# Patient Record
Sex: FEMALE | Race: WHITE | NOT HISPANIC OR LATINO | Employment: OTHER | URBAN - METROPOLITAN AREA
[De-identification: names, ages, dates, MRNs, and addresses within clinical notes are randomized per-mention and may not be internally consistent; named-entity substitution may affect disease eponyms.]

---

## 2021-01-15 DIAGNOSIS — Z23 NEED FOR VACCINATION: ICD-10-CM

## 2021-02-16 ENCOUNTER — IMMUNIZATION (OUTPATIENT)
Dept: FAMILY PLANNING/WOMEN'S HEALTH CLINIC | Facility: IMMUNIZATION CENTER | Age: 76
End: 2021-02-16
Attending: INTERNAL MEDICINE
Payer: MEDICARE

## 2021-02-16 DIAGNOSIS — Z23 NEED FOR VACCINATION: ICD-10-CM

## 2021-02-16 DIAGNOSIS — Z23 ENCOUNTER FOR VACCINATION: Primary | ICD-10-CM

## 2021-02-16 PROCEDURE — 91300 PFIZER SARS-COV-2 VACCINE: CPT | Performed by: INTERNAL MEDICINE

## 2021-02-16 PROCEDURE — 0001A PFIZER SARS-COV-2 VACCINE: CPT | Performed by: INTERNAL MEDICINE

## 2021-03-02 ENCOUNTER — TELEPHONE (OUTPATIENT)
Dept: HEMATOLOGY ONCOLOGY | Facility: MEDICAL CENTER | Age: 76
End: 2021-03-02

## 2021-03-02 NOTE — TELEPHONE ENCOUNTER
Received referral to lung cancer screening program.  Chart review to assess for lung cancer screening program eligibility.   1. Age 55-77 yrs of age? Yes 75 y.o.  2. 30 pack year hx of smoking, or greater? Yes 1 [id number] hx  3. Current smoker or if quit, has pt quit within last 15 yrs?Yes  Current smoker  4. Any signs or symptoms of lung cancer? None noted  5. Previous history of lung cancer? None noted  6. Chest CT within past 12 mos.? None noted  Patient does meet eligibility criteria. [facility] scheduling notified to schedule the shared decision making visit.

## 2021-03-09 ENCOUNTER — OFFICE VISIT (OUTPATIENT)
Dept: HEMATOLOGY ONCOLOGY | Facility: MEDICAL CENTER | Age: 76
End: 2021-03-09
Payer: MEDICARE

## 2021-03-09 VITALS
HEART RATE: 84 BPM | BODY MASS INDEX: 21.81 KG/M2 | TEMPERATURE: 99.3 F | HEIGHT: 61 IN | SYSTOLIC BLOOD PRESSURE: 138 MMHG | OXYGEN SATURATION: 92 % | RESPIRATION RATE: 19 BRPM | DIASTOLIC BLOOD PRESSURE: 72 MMHG | WEIGHT: 115.52 LBS

## 2021-03-09 DIAGNOSIS — F17.210 CIGARETTE SMOKER: ICD-10-CM

## 2021-03-09 PROCEDURE — G0296 VISIT TO DETERM LDCT ELIG: HCPCS | Performed by: NURSE PRACTITIONER

## 2021-03-09 RX ORDER — BUPROPION HYDROCHLORIDE 150 MG/1
TABLET, EXTENDED RELEASE ORAL
COMMUNITY
Start: 2021-02-22 | End: 2021-07-02

## 2021-03-09 RX ORDER — LIOTHYRONINE SODIUM 5 UG/1
10 TABLET ORAL DAILY
COMMUNITY
Start: 2020-12-28

## 2021-03-09 RX ORDER — IBANDRONATE SODIUM 150 MG/1
TABLET, FILM COATED ORAL
COMMUNITY
Start: 2021-02-22

## 2021-03-09 RX ORDER — AMLODIPINE BESYLATE 5 MG/1
5 TABLET ORAL
COMMUNITY
Start: 2021-02-27

## 2021-03-09 RX ORDER — LOSARTAN POTASSIUM 100 MG/1
100 TABLET ORAL
COMMUNITY
Start: 2021-02-22

## 2021-03-09 RX ORDER — TRIAMTERENE AND HYDROCHLOROTHIAZIDE 37.5; 25 MG/1; MG/1
1 TABLET ORAL DAILY
COMMUNITY
Start: 2021-03-02

## 2021-03-09 ASSESSMENT — PAIN SCALES - GENERAL: PAINLEVEL: 2=MINIMAL-SLIGHT

## 2021-03-09 ASSESSMENT — ENCOUNTER SYMPTOMS
SPUTUM PRODUCTION: 1
WEIGHT LOSS: 0
HEMOPTYSIS: 0
SHORTNESS OF BREATH: 1
WHEEZING: 1
COUGH: 1

## 2021-03-09 ASSESSMENT — PATIENT HEALTH QUESTIONNAIRE - PHQ9: CLINICAL INTERPRETATION OF PHQ2 SCORE: 0

## 2021-03-09 NOTE — PROGRESS NOTES
"Subjective:      Rose Oshea is a 75 y.o. female who presents with Lung Cancer Screening Program Prescreen ([facility] Dep/Ashley Montgomery ) for lung cancer screening shared decision making visit.           HPI    Patient seen today for initial lung cancer screening visit. Patient referred by her PCP Ashley Montgomery PA-C.     The patient meets eligibility criteria including age, smoking history (30+ pack years), if former smoker, quit in the last 15 years, and absence of signs or symptoms of lung cancer.    - Age - 75  - Smoking history - Patient has smoked for 55 years at an average of 1 ppd = 55 pack year smoking history.  - Current smoking status - Current smoker and plans to start Wellbutrin next week in an attempt to quit.   - No symptoms of lung cancer and no previous history of lung cancer     Not on File  Current Outpatient Medications on File Prior to Visit   Medication Sig Dispense Refill   • amLODIPine (NORVASC) 5 MG Tab Take 5 mg by mouth every day.     • buPROPion SR (WELLBUTRIN-SR) 150 MG TABLET SR 12 HR sustained-release tablet TAKE ONE TABLET BY MOUTH ONCE DAILY FOR THREE DAYS, THEN TWICE DAILY     • ibandronate (BONIVA) 150 MG tablet PLEASE SEE ATTACHED FOR DETAILED DIRECTIONS     • liothyronine (CYTOMEL) 5 MCG Tab Take 10 mcg by mouth.     • losartan (COZAAR) 100 MG Tab Take 100 mg by mouth every day.     • triamterene-hctz (MAXZIDE-25/DYAZIDE) 37.5-25 MG Tab      • ANORO ELLIPTA 62.5-25 MCG/INH AEROSOL POWDER, BREATH ACTIVATED inhaler        No current facility-administered medications on file prior to visit.       Review of Systems   Constitutional: Negative for malaise/fatigue and weight loss.   Respiratory: Positive for cough (worse in the AM), sputum production, shortness of breath (with exertion) and wheezing (occasional). Negative for hemoptysis.           Objective:     /72   Pulse 84   Temp 37.4 °C (99.3 °F) (Temporal)   Resp 19   Ht 1.549 m (5' 1\")   Wt 52.4 kg (115 lb 8.3 oz)  "  SpO2 92%   BMI 21.83 kg/m²      Physical Exam  Vitals reviewed.   Constitutional:       General: She is not in acute distress.     Appearance: Normal appearance. She is well-developed. She is not diaphoretic.   HENT:      Head: Normocephalic and atraumatic.   Cardiovascular:      Rate and Rhythm: Normal rate and regular rhythm.      Heart sounds: Normal heart sounds. No murmur. No friction rub. No gallop.    Pulmonary:      Effort: Pulmonary effort is normal. No respiratory distress.      Breath sounds: Normal breath sounds. No wheezing.   Musculoskeletal:         General: Normal range of motion.   Skin:     General: Skin is warm and dry.   Neurological:      Mental Status: She is alert and oriented to person, place, and time.                 Assessment/Plan:        1. Cigarette smoker  CT-LUNG CANCER-SCREENING       We conducted a shared decision-making process using a decision aid. We reviewed benefits and harms of screening, including false positives and potential need for additional diagnostic testing, the possibility of over diagnosis, and total radiation exposure.    We discussed the importance of adhering to annual LDCT screening. We also discussed the impact of comorbities on the patient's the ability or willingness to undergo diagnostic procedure(s) and treatment.    Counseling on the importance of maintaining cigarette smoking abstinence if former smoker; or the importance of smoking cessation if current smoker and, if appropriate, furnishing of information about tobacco cessation interventions. I provided patient with smoking cessation materials and resources within [facility] and the community. Patient appreciative of the resources.       Based on our discussion, we have decided to begin annual lung cancer screening starting now.

## 2021-03-10 ENCOUNTER — IMMUNIZATION (OUTPATIENT)
Dept: FAMILY PLANNING/WOMEN'S HEALTH CLINIC | Facility: IMMUNIZATION CENTER | Age: 76
End: 2021-03-10
Attending: INTERNAL MEDICINE
Payer: MEDICARE

## 2021-03-10 DIAGNOSIS — Z23 ENCOUNTER FOR VACCINATION: Primary | ICD-10-CM

## 2021-03-10 PROCEDURE — 0002A PFIZER SARS-COV-2 VACCINE: CPT

## 2021-03-10 PROCEDURE — 91300 PFIZER SARS-COV-2 VACCINE: CPT

## 2021-03-15 ENCOUNTER — HOSPITAL ENCOUNTER (OUTPATIENT)
Dept: RADIOLOGY | Facility: MEDICAL CENTER | Age: 76
End: 2021-03-15
Attending: NURSE PRACTITIONER
Payer: MEDICARE

## 2021-03-15 ENCOUNTER — TELEPHONE (OUTPATIENT)
Dept: HEMATOLOGY ONCOLOGY | Facility: MEDICAL CENTER | Age: 76
End: 2021-03-15

## 2021-03-15 DIAGNOSIS — F17.210 CIGARETTE SMOKER: ICD-10-CM

## 2021-03-15 PROCEDURE — 71271 CT THORAX LUNG CANCER SCR C-: CPT

## 2021-03-15 NOTE — TELEPHONE ENCOUNTER
Patient recently completed lung cancer screening CT.  There is no suspicious pulmonary nodules no evidence of mediastinal or hilar adenopathy.  She does have some calcified granulomas.  Severe emphysematous changes were noted.  She also has some atherosclerotic vascular calcification and small hepatic simple cysts.  I did review the findings with the patient on the phone today and she was very pleased with the results.    Confirmed with patient that she has started her process of attempting to quit.  She started taking Wellbutrin today that was provided to her by her primary care provider in hopes of quitting smoking altogether.  Confirm with patient the recommendation would be for her to continue with annual screening which she will go ahead and follow-up with her PCP for all future lung cancer screening CTs.    Patient did verbalize understanding and was grateful for the call.      CT-LUNG CANCER-SCREENING    Result Date: 3/15/2021  3/15/2021 3:12 PM HISTORY/REASON FOR EXAM:  Smoking history. TECHNIQUE/EXAM DESCRIPTION AND NUMBER OF VIEWS: Lung cancer screening without contrast. Low dose noncontrast helical images were obtained of the chest from the lung apices through the costophrenic sulci utilizing thin collimation and intervals with reconstructed images sent to PACS in axial, coronal and sagittal planes. Low dose optimization technique was utilized for this CT exam including automated exposure control and adjustment of the mA and/or kV according to patient size. COMPARISON: None. FINDINGS: There is severe emphysematous change of the lungs. There is bibasilar atelectasis versus linear scarring. No suspicious pulmonary nodules. No evidence of mediastinal or hilar adenopathy. There are calcified granulomas within the right upper lobe. There are calcified mediastinal and right hilar lymph nodes. There are simple cysts within the right lobe of the liver. There are splenic granulomas. No bony destructive lesions.      1.  No suspicious pulmonary nodules. 2.  Calcified granulomas. 3.  Severe emphysematous change of the lungs. 4.  Atherosclerotic vascular calcification. 5.  Small hepatic cysts. Lung RADS: 1 - Negative: No nodules and definitely benign nodules Findings: no lung nodules nodule(s) with specific calcifications: complete, central, popcorn, concentric rings and fat containing nodules Management: Continue annual screening with LDCT in 12 months

## 2021-03-15 NOTE — TELEPHONE ENCOUNTER
Left a voice message for patient to call back.  Voice message was given to someone at her home we took down my name and information.

## 2021-03-15 NOTE — LETTER
" [address]  [place] [address]  P [phone]  F [phone]         Date: March 16, 2021    Rose Oshea  [address]   [place] [address]    Re:  Low-dose chest CT performed on 3/15/2021    Medical Record Number: [id number]    Dear Rose,    We are pleased to let you know that the results of your recent low-dose chest CT (LDCT) examination were negative, or showed no evidence of lung nodule or mass.  The radiologist recommends to continue annual screening with LDCT in 12 months.  In the event that any additional \"incidental\" findings were identified from this exam, we have communicated back to your primary care provider for follow-up.    Here are some other important points you should know:  · Your low-dose Chest CT report has been sent to your referring or primary health care provider and is available to participants in Peer[facility].  As a part of our Lung Cancer Screening program we will remind you and your referring health care provider when your next LDCT screening is due.  · Although low-dose chest CT is very effective at finding lung cancer early, it cannot find all lung cancers. If you develop any new symptoms such as shortness of breath, chest pain, or coughing up blood, please call your doctor.  · Please keep in mind that good health involves quitting smoking (for help, call [facility] Quit Tobacco program at [phone]), an annual physical exam, and continued screening with low-dose chest CT.    Thank you for participating in the Lung Cancer Screening program. If you have any questions about this letter or our program, please call our Nurse at [phone].    Sincerely,  Rupinder Serna MD, [facility]  Medical Director  [facility] Lung Cancer Screening Program    "

## 2021-07-02 ENCOUNTER — OFFICE VISIT (OUTPATIENT)
Dept: SLEEP MEDICINE | Facility: MEDICAL CENTER | Age: 76
End: 2021-07-02
Payer: MEDICARE

## 2021-07-02 VITALS
RESPIRATION RATE: 16 BRPM | TEMPERATURE: 97.7 F | OXYGEN SATURATION: 92 % | BODY MASS INDEX: 20.58 KG/M2 | HEART RATE: 83 BPM | SYSTOLIC BLOOD PRESSURE: 124 MMHG | HEIGHT: 61 IN | DIASTOLIC BLOOD PRESSURE: 66 MMHG | WEIGHT: 109 LBS

## 2021-07-02 DIAGNOSIS — Z72.0 TOBACCO USE: ICD-10-CM

## 2021-07-02 DIAGNOSIS — J44.9 CHRONIC OBSTRUCTIVE PULMONARY DISEASE, UNSPECIFIED COPD TYPE (HCC): ICD-10-CM

## 2021-07-02 PROCEDURE — 99204 OFFICE O/P NEW MOD 45 MIN: CPT | Performed by: INTERNAL MEDICINE

## 2021-07-02 RX ORDER — ALBUTEROL SULFATE 90 UG/1
1-2 AEROSOL, METERED RESPIRATORY (INHALATION) EVERY 4 HOURS PRN
Qty: 1 EACH | Refills: 5 | Status: CANCELLED | OUTPATIENT
Start: 2021-07-02

## 2021-07-02 RX ORDER — ALBUTEROL SULFATE 90 UG/1
1-2 AEROSOL, METERED RESPIRATORY (INHALATION) EVERY 4 HOURS PRN
Qty: 1 EACH | Refills: 5 | Status: SHIPPED | OUTPATIENT
Start: 2021-07-02 | End: 2021-09-14 | Stop reason: SDUPTHER

## 2021-07-02 RX ORDER — ATORVASTATIN CALCIUM 10 MG/1
10 TABLET, FILM COATED ORAL NIGHTLY
COMMUNITY

## 2021-07-02 RX ORDER — CHOLECALCIFEROL (VITAMIN D3) 25 MCG
TABLET ORAL DAILY
COMMUNITY

## 2021-07-02 RX ORDER — GAUZE BANDAGE 2" X 2"
BANDAGE TOPICAL DAILY
COMMUNITY
End: 2022-09-01

## 2021-07-02 RX ORDER — IPRATROPIUM BROMIDE AND ALBUTEROL SULFATE 2.5; .5 MG/3ML; MG/3ML
3 SOLUTION RESPIRATORY (INHALATION) EVERY 4 HOURS PRN
Qty: 120 ML | Refills: 11 | Status: SHIPPED | OUTPATIENT
Start: 2021-07-02 | End: 2021-09-14 | Stop reason: SDUPTHER

## 2021-07-02 ASSESSMENT — ENCOUNTER SYMPTOMS
BACK PAIN: 0
CHILLS: 0
SHORTNESS OF BREATH: 1
HEMOPTYSIS: 1
ABDOMINAL PAIN: 0
CLAUDICATION: 0
FEVER: 0
HEARTBURN: 0
EYE DISCHARGE: 0
NECK PAIN: 0
SINUS PAIN: 0
STRIDOR: 0
SPEECH CHANGE: 0
PALPITATIONS: 0
WEAKNESS: 0
MYALGIAS: 0
HEADACHES: 0
FOCAL WEAKNESS: 0
WHEEZING: 0
FALLS: 0
DIAPHORESIS: 0
TREMORS: 0
NAUSEA: 0
DIZZINESS: 0
EYE PAIN: 0
DOUBLE VISION: 0
PHOTOPHOBIA: 0
COUGH: 1
WEIGHT LOSS: 0
DIARRHEA: 0
SPUTUM PRODUCTION: 0
CONSTIPATION: 0
PND: 0
ORTHOPNEA: 0
EYE REDNESS: 0
DEPRESSION: 0
SORE THROAT: 0
BLURRED VISION: 0
VOMITING: 0

## 2021-07-02 NOTE — PATIENT INSTRUCTIONS
mucinex (guaifenesin only ingredient) 600 mg tablets regular release 1-2x daily; try to use 30 minutes before a breathing treatment with nebulizer

## 2021-07-02 NOTE — PROGRESS NOTES
Chief Complaint   Patient presents with   • Establish Care     referral 21 REGLA Montgomery pa-c    • Results     Ct Lung cancer screening 3/15/21        HPI: This patient is a 76 y.o. female presenting for evaluation of COPD.  Patient's past medical history is essentially unremarkable.  No surgical history.  She is a current tobacco smoker with greater than 50-pack-year history currently smoking 1/2 pack/day.  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  Family history is notable for a father who suffered from lung cancer and  in his 50s was a tobacco smoker but also had a career in the [employer].  Brother who was a non-smoker also  from lung cancer, mother  of dementia at the age of 82.  Patient presents today for suspected COPD.  She has never had formal pulmonary function testing or been treated for COPD exacerbation.  She has never been hospitalized for pulmonary issues.  She does have a chronic cough typically worse pursing in the morning productive of clear to yellow mucus.  She also notes shortness of breath when walking her driveway which is 145 feet and she can do its full length 4 times but becomes short of breath after this.  This has been slowly progressive particularly over the past year.  She is saturating 90 to 92% on room air today in clinic.  She had lung cancer screening CT from March 15 that showed emphysematous changes, bibasilar atelectasis but no concerning nodules or lymphadenopathy.  She is on Anoro and has been on this for some time.  She also has albuterol which she uses 2 times a day via metered-dose inhaler which helps her breathing.    History reviewed. No pertinent past medical history.    Social History     Socioeconomic History   • Marital status: Single     Spouse name: Not on file   • Number of children: Not on file   • Years of education: Not on file   • Highest education level: Not on file   Occupational  History   • Not on file   Tobacco Use   • Smoking status: Current Every Day Smoker     Packs/day: 1.00     Years: 55.00     Pack years: 55.00     Types: Cigarettes     Start date: 1965   • Smokeless tobacco: Never Used   Vaping Use   • Vaping Use: Former   • Substances: Nicotine   • Passive vaping exposure Yes   Substance and Sexual Activity   • Alcohol use: Not Currently   • Drug use: Never   • Sexual activity: Not on file   Other Topics Concern   • Not on file   Social History Narrative   • Not on file     Social Determinants of Health     Financial Resource Strain:    • Difficulty of Paying Living Expenses:    Food Insecurity:    • Worried About Running Out of Food in the Last Year:    • Ran Out of Food in the Last Year:    Transportation Needs:    • Lack of Transportation (Medical):    • Lack of Transportation (Non-Medical):    Physical Activity:    • Days of Exercise per Week:    • Minutes of Exercise per Session:    Stress:    • Feeling of Stress :    Social Connections:    • Frequency of Communication with Friends and Family:    • Frequency of Social Gatherings with Friends and Family:    • Attends Rastafari Services:    • Active Member of Clubs or Organizations:    • Attends Club or Organization Meetings:    • Marital Status:    Intimate Partner Violence:    • Fear of Current or Ex-Partner:    • Emotionally Abused:    • Physically Abused:    • Sexually Abused:        History reviewed. No pertinent family history.    Current Outpatient Medications on File Prior to Visit   Medication Sig Dispense Refill   • atorvastatin (LIPITOR) 10 MG Tab Take 10 mg by mouth every evening.     • Cholecalciferol (VITAMIN D3) 25 MCG Tab Take  by mouth.     • B Complex-C (SUPER B COMPLEX/VITAMIN C) Tab Take  by mouth.     • Multiple Vitamins-Minerals (PRESERVISION AREDS 2 PO) Take  by mouth.     • Ibuprofen (MOTRIN PO) Take 400 mg by mouth every day.     • amLODIPine (NORVASC) 5 MG Tab Take 5 mg by mouth every day.     •  "ibandronate (BONIVA) 150 MG tablet PLEASE SEE ATTACHED FOR DETAILED DIRECTIONS     • liothyronine (CYTOMEL) 5 MCG Tab Take 10 mcg by mouth.     • losartan (COZAAR) 100 MG Tab Take 100 mg by mouth every day.     • triamterene-hctz (MAXZIDE-25/DYAZIDE) 37.5-25 MG Tab      • ANORO ELLIPTA 62.5-25 MCG/INH AEROSOL POWDER, BREATH ACTIVATED inhaler      • buPROPion SR (WELLBUTRIN-SR) 150 MG TABLET SR 12 HR sustained-release tablet TAKE ONE TABLET BY MOUTH ONCE DAILY FOR THREE DAYS, THEN TWICE DAILY (Patient not taking: Reported on 7/2/2021)       No current facility-administered medications on file prior to visit.       Allergies: Patient has no known allergies.    ROS:   Review of Systems   Constitutional: Negative for chills, diaphoresis, fever, malaise/fatigue and weight loss.   HENT: Negative for congestion, ear discharge, ear pain, hearing loss, nosebleeds, sinus pain, sore throat and tinnitus.    Eyes: Negative for blurred vision, double vision, photophobia, pain, discharge and redness.   Respiratory: Positive for cough, hemoptysis and shortness of breath. Negative for sputum production, wheezing and stridor.    Cardiovascular: Negative for chest pain, palpitations, orthopnea, claudication, leg swelling and PND.   Gastrointestinal: Negative for abdominal pain, constipation, diarrhea, heartburn, nausea and vomiting.   Genitourinary: Negative for dysuria and urgency.   Musculoskeletal: Negative for back pain, falls, joint pain, myalgias and neck pain.   Skin: Negative for itching and rash.   Neurological: Negative for dizziness, tremors, speech change, focal weakness, weakness and headaches.   Endo/Heme/Allergies: Negative for environmental allergies.   Psychiatric/Behavioral: Negative for depression.       /66 (BP Location: Right arm, Patient Position: Sitting, BP Cuff Size: Adult)   Pulse 83   Temp 36.5 °C (97.7 °F) (Temporal)   Resp 16   Ht 1.549 m (5' 1\")   Wt 49.4 kg (109 lb)   SpO2 92%     Physical " Exam:  Physical Exam  Vitals reviewed.   Constitutional:       General: She is not in acute distress.     Appearance: Normal appearance. She is well-developed and normal weight.   HENT:      Head: Normocephalic and atraumatic.      Right Ear: External ear normal.      Left Ear: External ear normal.      Nose: Nose normal. No congestion.      Mouth/Throat:      Mouth: Mucous membranes are moist.      Pharynx: Oropharynx is clear. No oropharyngeal exudate.   Eyes:      General: No scleral icterus.     Extraocular Movements: Extraocular movements intact.      Conjunctiva/sclera: Conjunctivae normal.      Pupils: Pupils are equal, round, and reactive to light.   Neck:      Vascular: No JVD.      Trachea: No tracheal deviation.   Cardiovascular:      Rate and Rhythm: Normal rate and regular rhythm.      Heart sounds: Normal heart sounds. No murmur heard.   No friction rub. No gallop.    Pulmonary:      Effort: Pulmonary effort is normal. No accessory muscle usage or respiratory distress.      Breath sounds: No wheezing or rales.      Comments: Decreased air movement b/l at apices  Abdominal:      General: There is no distension.      Palpations: Abdomen is soft.      Tenderness: There is no abdominal tenderness.   Musculoskeletal:         General: No tenderness or deformity. Normal range of motion.      Cervical back: Normal range of motion and neck supple.      Right lower leg: No edema.      Left lower leg: No edema.   Lymphadenopathy:      Cervical: No cervical adenopathy.   Skin:     General: Skin is warm and dry.      Findings: No rash.      Nails: There is no clubbing.   Neurological:      Mental Status: She is alert and oriented to person, place, and time.      Cranial Nerves: No cranial nerve deficit.      Gait: Gait normal.   Psychiatric:         Mood and Affect: Mood normal.         Behavior: Behavior normal.         PFTs as reviewed by me personally: as per hPI    Imaging as reviewed by me personally: as per  HPI    Assessment:  1. Chronic obstructive pulmonary disease, unspecified COPD type (HCC)  DME Nebulizer    PULMONARY FUNCTION TESTS -Test requested: Complete Pulmonary Function Test    Exercise Test for Bronchospasm / 6-Minute Walk   2. Tobacco use         Plan:  1.  This is suspected based on tobacco history, shortness of breath, borderline SPO2 and chronic cough.  I see no clear indication for inhaled corticosteroid I do think she would benefit from some airway clearance with nebulized bronchodilators and Mucinex scheduled in the morning and as needed throughout the day.  Continue short acting bronchodilator via MDI as needed.  Patient was counseled on tobacco cessation.  She reports being up-to-date on vaccines.  We will verify pneumococcal vaccination.  2.  Patient is an active tobacco smoker.  She is participating in lung cancer screening.  She was counseled on tobacco cessation.  Return in about 4 months (around 11/2/2021) for PFTs, 6 MWt.

## 2021-07-08 ENCOUNTER — TELEPHONE (OUTPATIENT)
Dept: SLEEP MEDICINE | Facility: MEDICAL CENTER | Age: 76
End: 2021-07-08

## 2021-07-08 NOTE — TELEPHONE ENCOUNTER
Caller: Rose    Phone Number:[phone] (home)     Message: Pt called and lm.  She saw Dr Mckeon on Friday. I was referred to [facility] for Nebulizer and the medication. They have the Rx for the Nebulizer but they don't have the rx for the medication.     07/08/21:  Called and spoke with pt. Notified pt her Rx for the Nebulizer solution was sent to her local pharmacy. Pt understood. She will call them.

## 2021-09-14 ENCOUNTER — SLEEP CENTER VISIT (OUTPATIENT)
Dept: SLEEP MEDICINE | Facility: MEDICAL CENTER | Age: 76
End: 2021-09-14
Payer: MEDICARE

## 2021-09-14 VITALS
HEIGHT: 61 IN | SYSTOLIC BLOOD PRESSURE: 124 MMHG | BODY MASS INDEX: 20.27 KG/M2 | OXYGEN SATURATION: 93 % | WEIGHT: 107.38 LBS | HEART RATE: 91 BPM | DIASTOLIC BLOOD PRESSURE: 72 MMHG

## 2021-09-14 DIAGNOSIS — J44.1 COPD EXACERBATION (HCC): ICD-10-CM

## 2021-09-14 DIAGNOSIS — J44.9 CHRONIC OBSTRUCTIVE PULMONARY DISEASE, UNSPECIFIED COPD TYPE (HCC): ICD-10-CM

## 2021-09-14 PROCEDURE — 99213 OFFICE O/P EST LOW 20 MIN: CPT | Performed by: INTERNAL MEDICINE

## 2021-09-14 RX ORDER — GUAIFENESIN 600 MG/1
600 TABLET, EXTENDED RELEASE ORAL EVERY 12 HOURS
COMMUNITY

## 2021-09-14 RX ORDER — PREDNISONE 10 MG/1
TABLET ORAL
Qty: 30 TABLET | Refills: 2 | Status: SHIPPED | OUTPATIENT
Start: 2021-09-14 | End: 2021-09-14

## 2021-09-14 RX ORDER — TRIAMCINOLONE ACETONIDE 40 MG/ML
40 INJECTION, SUSPENSION INTRA-ARTICULAR; INTRAMUSCULAR ONCE
Status: COMPLETED | OUTPATIENT
Start: 2021-09-14 | End: 2021-09-14

## 2021-09-14 RX ORDER — IPRATROPIUM BROMIDE AND ALBUTEROL SULFATE 2.5; .5 MG/3ML; MG/3ML
3 SOLUTION RESPIRATORY (INHALATION) EVERY 4 HOURS PRN
Qty: 120 ML | Refills: 11 | Status: SHIPPED | OUTPATIENT
Start: 2021-09-14 | End: 2021-09-29 | Stop reason: SDUPTHER

## 2021-09-14 RX ORDER — DOXYCYCLINE HYCLATE 100 MG/1
100 CAPSULE ORAL 2 TIMES DAILY
Qty: 20 CAPSULE | Refills: 1 | Status: SHIPPED | OUTPATIENT
Start: 2021-09-14 | End: 2021-10-18

## 2021-09-14 RX ORDER — ALBUTEROL SULFATE 90 UG/1
1-2 AEROSOL, METERED RESPIRATORY (INHALATION) EVERY 4 HOURS PRN
Qty: 1 EACH | Refills: 5 | Status: SHIPPED | OUTPATIENT
Start: 2021-09-14 | End: 2022-09-01 | Stop reason: SDUPTHER

## 2021-09-14 RX ADMIN — TRIAMCINOLONE ACETONIDE 40 MG: 40 INJECTION, SUSPENSION INTRA-ARTICULAR; INTRAMUSCULAR at 09:49

## 2021-09-14 NOTE — PROGRESS NOTES
"Pulmonary Clinic Office Note    Date of Visit: 2021 PCP: Ashley Montgomery P.A.-C.    Subjective:    Chart reviewed prior to patient interview.    Rose Oshea is a 76 y.o. year old female, with a PMHx of ***  who presented to the Pulmonary Clinic for a ***regular follow up ***new referral.    Reason for visit: \"***\"    Background:  According to Dr Mckeon's note from 21: \"This patient is a 76 y.o. female presenting for evaluation of COPD.  Patient's past medical history is essentially unremarkable.  No surgical history.  She is a current tobacco smoker with greater than 50-pack-year history currently smoking 1/2 pack/day.  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  Family history is notable for a father who suffered from lung cancer and  in his 50s was a tobacco smoker but also had a career in the [employer].  Brother who was a non-smoker also  from lung cancer, mother  of dementia at the age of 82.  Patient presents today for suspected COPD.  She has never had formal pulmonary function testing or been treated for COPD exacerbation.  She has never been hospitalized for pulmonary issues.  She does have a chronic cough typically worse pursing in the morning productive of clear to yellow mucus.  She also notes shortness of breath when walking her driveway which is 145 feet and she can do its full length 4 times but becomes short of breath after this.  This has been slowly progressive particularly over the past year.  She is saturating 90 to 92% on room air today in clinic.  She had lung cancer screening CT from March 15 that showed emphysematous changes, bibasilar atelectasis but no concerning nodules or lymphadenopathy.  She is on Anoro and has been on this for some time.  She also has albuterol which she uses 2 times a day via metered-dose inhaler which helps her breathing.\"    Today:  ***    No GERD symptoms  No known exposure to a " "person with Bordetella pertussis aka whooping cough  No history of respiratory failure requiring mechanical ventilation  No history of hemoptysis.  No Personal history of non-resolving or recurrent pneumonia  No Personal history of DVT or pulmonary embolism  No Personal history of recurrent sinusitis or epistaxis/purulent discharge  No reported NSAID precipitated cough, wheeze or sinus congestion    No cold air intolerance  No exercise induced cough wheeze  No family hx of atopy and or asthma  No history of nasal polyps  No hx of recent COVID-19 infection    Pulmonary History:  Inhaler Regimen before this clinic visit: ***  Tobacco use:  {yes no:19047::\"No\"}  Occupational exposure: {yes no:19047::\"No\"}  Dust/Mold Exposure: {yes no:19047::\"No\"}  Pet(s) exposure: {yes no:19047::\"No\"}  TB exposure: {yes no:19047::\"No\"}    MMRC Dyspnea Scale  Grade  Description of Breathlessness   0  I only get breathless with strenuous exercise.   1  I get short of breath when hurrying on level ground or walking up a slight hill.   2  On level ground, I walk slower than people of the same age because of breathlessness, or have to stop for breath when walking at my own pace.   3  I stop for breath after walking about 100 yards or after a few minutes on level ground.   4  I am too breathless to leave the house or I am breathless when dressing.     Influenza Vaccine:  {yes no:19047::\"No\"}  COVID vaccine: ***  Pneumovax:  {yes no:19047::\"No\"}, When: ***  Hospitalizations:  {yes no:19047::\"No\"}  ER visits:  {yes no:19047::\"No\"}  Past Intubation:  {yes no:19047::\"No\"}    Sleep History:  Sleep Hygiene: ***  Snoring:  {yes no:19047::\"No\"}  Apnea:  {yes no:19047::\"No\"}  AM headaches:  {yes no:19047::\"No\"}  Fatigue:  {yes no:19047::\"No\"}  [place] sleepiness score: ***    [unfilled]  No past medical history on file.  No Known Allergies  No family history on file.  No past surgical history on file.  Social History     Tobacco Use   • Smoking " "status: Current Every Day Smoker     Packs/day: 1.00     Years: 55.00     Pack years: 55.00     Types: Cigarettes     Start date: 1965   • Smokeless tobacco: Never Used   Vaping Use   • Vaping Use: Former   • Substances: Nicotine   • Passive vaping exposure Yes   Substance Use Topics   • Alcohol use: Not Currently   • Drug use: Never       Other MD/DO:  Cardiologist: {yes no:98745::\"No\"}    Review of Systems (Positive in Bold, otherwise negative)    Constitutional: fever, chills, night sweats, weightloss  HEENT: headaches, migraines, vision changes, blurred vision, dry eyes,  changes in hearing, rhinorrhea, sinus congestion, dysphagia  Hoarseness of voice, choking  CV: chest pain, GONZALEZ, orthopnea, edema, palpitations  Resp: SOB, cough, hemoptysis, asthma, repeated infections  GI: changes in appetite, nausea, vomiting, diarrhea, constipation, pain, GERD  : Dysuria, hematuria, nocturia  Lymph: swollen glands  MSK: Muscle weakness, wasting, arthralgia, myalgia  Neuro/Psych: Sensory disturbances, seizures, syncope, anxiety, depression    Objective:    Vitals: There were no vitals taken for this visit.    Wt Readings from Last 3 Encounters:   07/02/21 49.4 kg (109 lb)   03/09/21 52.4 kg (115 lb 8.3 oz)     Gen: AAO x 3, appears of stated age, well-nourished and in NAD  Eyes: sclerae anicteric, conjunctivae appear normal, PEERLA, EOM in tact  ENT: TMs appear normal bilaterally without erythema/bulging, no pain on palpation of tragus/helix, EAC appears normal w/o erythema/exudate.  Overall oral hygiene is ***  Mallampati Score: ***  Neck: Supple w/o evidence of LAD, thyroid normal and size and w/o nodularity  CV: RRR w/o murmurs, rubs, gallops. Normal S1/S2. No peripheral edema orJVD.  Lungs: CTAB w/o wheezing, rales, rhonchi. Good air entry, normal chest excursion.  GI: Soft and non-tender, + BS x 4. No rebound or guarding.  Extremities: +2/4 bilateral pedal pulses, cool and dry, no edema.  MS: +5/5 bilateral UE/LE " muscle strength testing, no obvious joint deformities, swelling noted, good overall muscle tone  Neuro: No focal neurological deficits    Labs, Imaging & Scoring Systems:    Lab results since patient's previous encounter were reviewed with the patient.  Pertinent results discussed below or included within the note.    PFTs (Date: ***)-  [unfilled]    CXR: (Date: ***)-      CT Chest: (Date: ***)-      Impression:    1. ***  2. ***  3. ***      Discussion:    Rose Oshea is a 76 y.o. with ***    Plan:    1. ***  2. ***counseling provided during the visit for smoking cessation   3. ***Recommend to follow up with already scheduled PFTs and 6MWT.  4. Continue current inhaler regimen:  ***    * Patient Education                         - Educated the patient on his/her disease processes                         - Answered all questions to the patients satisfaction.                         - Reminded the patient to bring all of his/her medication bottles to the next office visit.                           * Return To Clinic:  ***months or PRN      The patient voiced understanding of the above treatment approach and agreed with the direction of care. The patient was educated about their conditions and all questions were addressed during this encounter. I have also reminded the patient to bring all of their medications to the next office visit.  Rose Oshea was instructed to call the office if any questions or concerns arise prior to their next appointment.      [place] M.D. 9/14/2021 7:36 AM    This note reflects my clinical thought processes and is intended primarily for the exchange of information between healthcare providers.  It is not written primarily to communicate with the patient or family directly, which takes place in-person in clinic, by phone/virtual visits or the bedside.  This note might contain sensitive information, including substance use, mental health and consideration of sensitive, serious  "or other \"do-not-miss\" diagnoses, which is further discussed at the bedside.    "

## 2021-09-15 PROBLEM — J44.1 COPD EXACERBATION (HCC): Status: ACTIVE | Noted: 2021-09-15

## 2021-09-15 ASSESSMENT — ENCOUNTER SYMPTOMS
SHORTNESS OF BREATH: 1
PSYCHIATRIC NEGATIVE: 1
COUGH: 1
EYES NEGATIVE: 1
CARDIOVASCULAR NEGATIVE: 1
WHEEZING: 1
MUSCULOSKELETAL NEGATIVE: 1
GASTROINTESTINAL NEGATIVE: 1
SPUTUM PRODUCTION: 1
WEAKNESS: 1

## 2021-09-16 NOTE — ASSESSMENT & PLAN NOTE
COPD exacerbation been ongoing for a week patient declines to get Covid test.  Symptoms of sputum production shortness of breath wheezing and continued use of nebulizer.  Patient high risk of decompensating   Emphasized to pt that she should come to ER if any worsening for system  Kenalog shot  Prednisone taper  Doxycycline  Follow up in 2 weeks

## 2021-09-16 NOTE — PROGRESS NOTES
Pulmonary Clinic follow up    Date of Service: 9/15/2021    Reason for follow up:  COPD (Last seen 07/02/21), Shortness of Breath ([id number]), and Cough (w/phlegm. [id number])      Problem List Items Addressed This Visit     COPD exacerbation (HCC)     COPD exacerbation been ongoing for a week patient declines to get Covid test.  Symptoms of sputum production shortness of breath wheezing and continued use of nebulizer.  Patient high risk of decompensating   Emphasized to pt that she should come to ER if any worsening for system  Kenalog shot  Prednisone taper  Doxycycline  Follow up in 2 weeks           Relevant Medications    guaiFENesin ER (MUCINEX) 600 MG TABLET SR 12 HR    albuterol 108 (90 Base) MCG/ACT Aero Soln inhalation aerosol    ipratropium-albuterol (DUONEB) 0.5-2.5 (3) MG/3ML nebulizer solution      Other Visit Diagnoses     Chronic obstructive pulmonary disease, unspecified COPD type (HCC)        Relevant Medications    guaiFENesin ER (MUCINEX) 600 MG TABLET SR 12 HR    albuterol 108 (90 Base) MCG/ACT Aero Soln inhalation aerosol    ipratropium-albuterol (DUONEB) 0.5-2.5 (3) MG/3ML nebulizer solution    triamcinolone acetonide (KENALOG-40) injection 40 mg (Completed)            History of Present Illness: Rose Oshea is a 76 y.o. female with a past medical history of emphysema with no formal PFTs were greater than 50-pack-year history currently smoking 1/2 pack/day.  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 Mckeon 2021 July 2.  Patient is on oxygen and uses Anoro as well as duo nebs which she has been continuously using every 4 hours in the last week.  Mucus production and shortness of breath. She denies any fevers. Has not had a Covid test. Has been vaccinated. She lives with her roommate. Declines a Covid test.    Review of Systems   Constitutional: Positive for malaise/fatigue.   HENT: Negative.    Eyes: Negative.     Respiratory: Positive for cough, sputum production, shortness of breath and wheezing.    Cardiovascular: Negative.    Gastrointestinal: Negative.    Genitourinary: Negative.    Musculoskeletal: Negative.    Skin: Negative.    Neurological: Positive for weakness.   Endo/Heme/Allergies: Negative.    Psychiatric/Behavioral: Negative.        Current Outpatient Medications on File Prior to Visit   Medication Sig Dispense Refill   • guaiFENesin ER (MUCINEX) 600 MG TABLET SR 12 HR Take 600 mg by mouth every 12 hours.     • atorvastatin (LIPITOR) 10 MG Tab Take 10 mg by mouth every evening.     • Cholecalciferol (VITAMIN D3) 25 MCG Tab Take  by mouth every day.     • B Complex-C (SUPER B COMPLEX/VITAMIN C) Tab Take  by mouth every day.     • Multiple Vitamins-Minerals (PRESERVISION AREDS 2 PO) Take  by mouth every day.     • Ibuprofen (MOTRIN PO) Take 400 mg by mouth every day.     • amLODIPine (NORVASC) 5 MG Tab Take 5 mg by mouth every day.     • ibandronate (BONIVA) 150 MG tablet PLEASE SEE ATTACHED FOR DETAILED DIRECTIONS     • liothyronine (CYTOMEL) 5 MCG Tab Take 10 mcg by mouth every day.     • losartan (COZAAR) 100 MG Tab Take 100 mg by mouth every day.     • triamterene-hctz (MAXZIDE-25/DYAZIDE) 37.5-25 MG Tab Take 1 Tablet by mouth every day.     • ANORO ELLIPTA 62.5-25 MCG/INH AEROSOL POWDER, BREATH ACTIVATED inhaler        No current facility-administered medications on file prior to visit.       Social History     Tobacco Use   • Smoking status: Current Every Day Smoker     Packs/day: 0.50     Years: 55.00     Pack years: 27.50     Types: Cigarettes     Start date: 1965   • Smokeless tobacco: Never Used   Vaping Use   • Vaping Use: Former   • Substances: Nicotine   • Passive vaping exposure Yes   Substance Use Topics   • Alcohol use: Not Currently   • Drug use: Never        History reviewed. No pertinent past medical history.    History reviewed. No pertinent surgical history.    Allergies: Patient has no  "known allergies.    History reviewed. No pertinent family history.    Vitals:    09/14/21 0847   Height: 1.549 m (5' 1\")   Weight: 48.7 kg (107 lb 6 oz)   Weight % change since last entry.: 0 %   BP: 124/72   Pulse: 91   BMI (Calculated): 20.29       Physical Examination  Physical Exam  Constitutional:       Appearance: She is ill-appearing.   HENT:      Head: Normocephalic and atraumatic.      Mouth/Throat:      Mouth: Mucous membranes are dry.   Eyes:      Extraocular Movements: Extraocular movements intact.      Pupils: Pupils are equal, round, and reactive to light.   Cardiovascular:      Rate and Rhythm: Tachycardia present.   Pulmonary:      Breath sounds: Wheezing present.      Comments: Tachypnea  Musculoskeletal:      Right lower leg: No edema.      Left lower leg: No edema.   Skin:     General: Skin is warm and dry.      Findings: No rash.   Neurological:      General: No focal deficit present.      Mental Status: She is oriented to person, place, and time.   Psychiatric:         Mood and Affect: Mood normal.         Behavior: Behavior normal.         Thought Content: Thought content normal.         Judgment: Judgment normal.                Beltran Barillas M.D., MD MPH [facility]  [facility]  "

## 2021-09-22 ENCOUNTER — TELEPHONE (OUTPATIENT)
Dept: SLEEP MEDICINE | Facility: MEDICAL CENTER | Age: 76
End: 2021-09-22

## 2021-09-22 NOTE — TELEPHONE ENCOUNTER
Patient called and left voice message (a few days ago) She saw Dr Rios on 9/14/21 and was told prednisone would be sent to pharmacy. They only received Antibiotic, Albuterol HFA and Duoneb. The office note discussed starting prednisone tapering BUT NO RX SENT. I called patient is was not a home so I left message on her cell phone. For a call back if prednisone is still needed.

## 2021-09-29 RX ORDER — IPRATROPIUM BROMIDE AND ALBUTEROL SULFATE 2.5; .5 MG/3ML; MG/3ML
3 SOLUTION RESPIRATORY (INHALATION) EVERY 4 HOURS PRN
Qty: 120 ML | Refills: 11 | Status: SHIPPED | OUTPATIENT
Start: 2021-09-29 | End: 2021-11-23 | Stop reason: SDUPTHER

## 2021-10-18 ENCOUNTER — OFFICE VISIT (OUTPATIENT)
Dept: URGENT CARE | Facility: CLINIC | Age: 76
End: 2021-10-18
Payer: MEDICARE

## 2021-10-18 VITALS
RESPIRATION RATE: 18 BRPM | DIASTOLIC BLOOD PRESSURE: 76 MMHG | HEART RATE: 81 BPM | HEIGHT: 61 IN | OXYGEN SATURATION: 91 % | SYSTOLIC BLOOD PRESSURE: 122 MMHG | WEIGHT: 109 LBS | BODY MASS INDEX: 20.58 KG/M2 | TEMPERATURE: 98.5 F

## 2021-10-18 DIAGNOSIS — J44.1 COPD EXACERBATION (HCC): ICD-10-CM

## 2021-10-18 PROCEDURE — 99213 OFFICE O/P EST LOW 20 MIN: CPT | Performed by: FAMILY MEDICINE

## 2021-10-18 RX ORDER — PREDNISONE 10 MG/1
TABLET ORAL
Qty: 30 TABLET | Refills: 0 | Status: SHIPPED | OUTPATIENT
Start: 2021-10-18 | End: 2021-11-23

## 2021-10-18 RX ORDER — AZITHROMYCIN 250 MG/1
TABLET, FILM COATED ORAL
Qty: 6 TABLET | Refills: 0 | Status: SHIPPED | OUTPATIENT
Start: 2021-10-18 | End: 2021-11-23

## 2021-10-18 ASSESSMENT — ENCOUNTER SYMPTOMS
VOMITING: 0
SHORTNESS OF BREATH: 1
COUGH: 1
FEVER: 0
SORE THROAT: 0
WHEEZING: 1

## 2021-10-18 NOTE — PROGRESS NOTES
Subjective:     Rose Oshea is a 76 y.o. female who presents for Shortness of Breath (x3-4 days, up 2AM gasping for air )    HPI  Pt presents for evaluation of an acute problem  Patient with shortness of breath for the past 4 days  Has been wheezing   No fevers   Has a cough chronically and a little worse lately   Recently treated with prednisone for COPD exacerbation 1 month ago  Symptoms feel very similar to 1 month ago  Has a pulse ox at home and has not had desaturations    Review of Systems   Constitutional: Negative for fever.   HENT: Negative for sore throat.    Respiratory: Positive for cough, shortness of breath and wheezing.    Gastrointestinal: Negative for vomiting.   Skin: Negative for rash.     PMH:  has no past medical history on file.  MEDS:   Current Outpatient Medications:   •  ipratropium-albuterol (DUONEB) 0.5-2.5 (3) MG/3ML nebulizer solution, Take 3 mL by nebulization every four hours as needed for Shortness of Breath., Disp: 120 mL, Rfl: 11  •  guaiFENesin ER (MUCINEX) 600 MG TABLET SR 12 HR, Take 600 mg by mouth every 12 hours., Disp: , Rfl:   •  albuterol 108 (90 Base) MCG/ACT Aero Soln inhalation aerosol, Inhale 1-2 Puffs every four hours as needed for Shortness of Breath., Disp: 1 Each, Rfl: 5  •  atorvastatin (LIPITOR) 10 MG Tab, Take 10 mg by mouth every evening., Disp: , Rfl:   •  Cholecalciferol (VITAMIN D3) 25 MCG Tab, Take  by mouth every day., Disp: , Rfl:   •  B Complex-C (SUPER B COMPLEX/VITAMIN C) Tab, Take  by mouth every day., Disp: , Rfl:   •  Multiple Vitamins-Minerals (PRESERVISION AREDS 2 PO), Take  by mouth every day., Disp: , Rfl:   •  Ibuprofen (MOTRIN PO), Take 400 mg by mouth every day., Disp: , Rfl:   •  amLODIPine (NORVASC) 5 MG Tab, Take 5 mg by mouth every day., Disp: , Rfl:   •  ibandronate (BONIVA) 150 MG tablet, PLEASE SEE ATTACHED FOR DETAILED DIRECTIONS, Disp: , Rfl:   •  liothyronine (CYTOMEL) 5 MCG Tab, Take 10 mcg by mouth every day., Disp: , Rfl:   •   "losartan (COZAAR) 100 MG Tab, Take 100 mg by mouth every day., Disp: , Rfl:   •  triamterene-hctz (MAXZIDE-25/DYAZIDE) 37.5-25 MG Tab, Take 1 Tablet by mouth every day., Disp: , Rfl:   •  ANORO ELLIPTA 62.5-25 MCG/INH AEROSOL POWDER, BREATH ACTIVATED inhaler, , Disp: , Rfl:   ALLERGIES: No Known Allergies  SURGHX: History reviewed. No pertinent surgical history.  SOCHX:  reports that she has been smoking cigarettes. She started smoking about 56 years ago. She has a 27.50 pack-year smoking history. She has never used smokeless tobacco. She reports current alcohol use. She reports that she does not use drugs.     Objective:   /76   Pulse 81   Temp 36.9 °C (98.5 °F) (Temporal)   Resp 18   Ht 1.549 m (5' 1\")   Wt 49.4 kg (109 lb)   SpO2 91%   Breastfeeding No   BMI 20.60 kg/m²     Physical Exam  Constitutional:       General: She is not in acute distress.     Appearance: She is well-developed. She is not diaphoretic.   HENT:      Head: Normocephalic and atraumatic.   Neck:      Trachea: No tracheal deviation.   Cardiovascular:      Rate and Rhythm: Normal rate and regular rhythm.   Pulmonary:      Comments: Able to speak in full sentences, mild increased work of breathing, poor air movement throughout, diffuse wheezes and rhonchi present, no focal rales appreciated  Skin:     General: Skin is warm and dry.      Findings: No rash.   Neurological:      Mental Status: She is alert.       Assessment/Plan:   Assessment    1. COPD exacerbation (HCC)  - predniSONE (DELTASONE) 10 MG Tab; Take 40mg x 3 days, then take 30mg x 3 days, then take 20mg x 3 days, then 10mg x 3 days with food, then discontinue  Dispense: 30 Tablet; Refill: 0  - azithromycin (ZITHROMAX) 250 MG Tab; Take 2 tabs PO first day, then take 1 tab PO daily x 4 days  Dispense: 6 Tablet; Refill: 0    Patient with COPD exacerbation.  Will treat with course of prednisone and a azithromycin.  Reviewed follow-up precautions and will plan to follow-up " with pulmonology.

## 2021-10-28 ENCOUNTER — NON-PROVIDER VISIT (OUTPATIENT)
Dept: SLEEP MEDICINE | Facility: MEDICAL CENTER | Age: 76
End: 2021-10-28
Attending: INTERNAL MEDICINE
Payer: MEDICARE

## 2021-10-28 VITALS — WEIGHT: 108 LBS | BODY MASS INDEX: 20.39 KG/M2 | HEIGHT: 61 IN

## 2021-10-28 DIAGNOSIS — J44.9 CHRONIC OBSTRUCTIVE PULMONARY DISEASE, UNSPECIFIED COPD TYPE (HCC): ICD-10-CM

## 2021-10-28 PROCEDURE — 94726 PLETHYSMOGRAPHY LUNG VOLUMES: CPT | Performed by: INTERNAL MEDICINE

## 2021-10-28 PROCEDURE — 94060 EVALUATION OF WHEEZING: CPT | Performed by: INTERNAL MEDICINE

## 2021-10-28 PROCEDURE — 94729 DIFFUSING CAPACITY: CPT | Performed by: INTERNAL MEDICINE

## 2021-10-28 PROCEDURE — 94618 PULMONARY STRESS TESTING: CPT | Performed by: INTERNAL MEDICINE

## 2021-10-28 ASSESSMENT — 6 MINUTE WALK TEST (6MWT)
SAO2 AT 6 MIN: 89
SAO2 AT 5 MIN: 89
HEART RATE AT 3 MIN: 98
PERCEIVED BREATHLESSNESS AT 2 MIN: 3
NUMBER OF RESTS: 0
PERCEIVED BREATHLESSNESS AT 1 MIN: 2
PERCENT OF NORMAL WALKED: 64
COMMENTS: TEST ON ROOM AIR.
PERCEIVED FATIGUE AT 1 MIN: 0
O2 SAT PERCENT ROOM AIR: 93
SAO2 AT 2 MIN: 92
PERCEIVED BREATHLESSNESS AT 2 MIN: 2
SAO2 AT 4 MIN: 89
PERCEIVED BREATHLESSNESS AT 3 MIN: 2
AMBULATES WITH O2: WITHOUT O2
HEART RATE AT 2 MIN: 86
PERCEIVED FATIGUE AT 1 MIN: 0
SAO2 AT 1 MIN: 89
BLOOD PRESSURE: LEFT ARM
PERCEIVED BREATHLESSNESS AT 5 MIN: 3
PERCEIVED FATIGUE AT 4 MIN: 0
HEART RATE AT 6 MIN: 105
PERCEIVED BREATHLESSNESS AT 1 MIN: 3
HEART RATE AT 4 MIN: 105
PERCEIVED FATIGUE AT 2 MIN: 0
SAO2 AT 2 MIN: 92
PERCEIVED FATIGUE AT 6 MIN: 0
PERCEIVED FATIGUE AT 3 MIN: 0
BLOOD PRESSURE AT 1 MIN: 132/80
HEART RATE AT 2 MIN: 98
HEART RATE AT 1 MIN: 92
HEART RATE: 76
HEART RATE AT 5 MIN: 105
SAO2 AT 1 MIN: 95
HEART RATE AT 1 MIN: 100
PERCEIVED BREATHLESSNESS AT 4 MIN: 3
PERCEIVED FATIGUE AT 5 MIN: 0
SITTING BLOOD PRESSURE: 124/68
TOTAL REST TIME: 0
SAO2 AT 3 MIN: 91
PERCEIVED FATIGUE AT 2 MIN: 0
PERCEIVED BREATHLESSNESS AT 6 MIN: 3

## 2021-10-28 ASSESSMENT — PULMONARY FUNCTION TESTS
FEV1/FVC: 46
FEV1/FVC_PREDICTED: 78
FEV1/FVC_PERCENT_LLN: 65
FEV1_LLN: 1.58
FEV1/FVC_PERCENT_PREDICTED: 58
FEV1_PERCENT_PREDICTED: 52
FVC: 2.17
FVC_PREDICTED: 2.44
FVC_PERCENT_PREDICTED: 88
FVC_LLN: 2.04
FEV1_PREDICTED: 1.89
FEV1/FVC: 46
FEV1/FVC_PERCENT_PREDICTED: 77
FEV1: .99
FEV1/FVC_PERCENT_PREDICTED: 59

## 2021-10-28 NOTE — PROCEDURES
Test on Room Air.    Room air oxygen saturation was 93% at rest.  With ambulation oxygen saturation dropped to 89%.  No supplemental oxygen was added.  The patient was able to walk for 64% of total distance.

## 2021-10-28 NOTE — PROCEDURES
Technician: Jayna Orellana RRT, CPFT  Good patient effort & cooperation. Patient took Duoneb about 1.5 hours prior to testing, NO POST FVC. Patient had coughing through out testing. The results of this test meet the ATS/ERS standards for acceptability & reproducibility.  Test was performed on the [employer] Body Plethysmograph-Elite DX system.  Predicted equations for Spirometry are GLI-2012, ITS for lung volumes, and GLI-2017 for DLCO.  The DLCO was uncorrected for Hgb.  A bronchodilator of Ventolin HFA -2puffs via spacer administered.  DLCO performed during dilation period.    1. Baseline spirometry demonstrates a moderate to severe reduction in FEV1. FEV1/FVC ratio is reduced at 46%.  FEV1 is 0.99 L which is 52% of predicted.    2.  An inhaled bronchodilator was not administered.  The patient had recently used an albuterol nebulizer.    3. Lung volumes demonstrate mild hyperinflation.    4. Gas exchange as estimated by DLCO is reduced at 66% of predicted.    5. Airway resistance is increased.      Impression:    This study demonstrates the presence of moderate to severe obstructive lung disease.

## 2021-11-23 ENCOUNTER — OFFICE VISIT (OUTPATIENT)
Dept: SLEEP MEDICINE | Facility: MEDICAL CENTER | Age: 76
End: 2021-11-23
Payer: MEDICARE

## 2021-11-23 VITALS
OXYGEN SATURATION: 90 % | HEART RATE: 82 BPM | DIASTOLIC BLOOD PRESSURE: 64 MMHG | SYSTOLIC BLOOD PRESSURE: 114 MMHG | WEIGHT: 108 LBS | BODY MASS INDEX: 20.39 KG/M2 | RESPIRATION RATE: 16 BRPM | TEMPERATURE: 98.1 F | HEIGHT: 61 IN

## 2021-11-23 DIAGNOSIS — Z72.0 TOBACCO USE: ICD-10-CM

## 2021-11-23 DIAGNOSIS — R06.02 SOB (SHORTNESS OF BREATH): ICD-10-CM

## 2021-11-23 DIAGNOSIS — J44.9 CHRONIC OBSTRUCTIVE PULMONARY DISEASE, UNSPECIFIED COPD TYPE (HCC): ICD-10-CM

## 2021-11-23 PROCEDURE — 99214 OFFICE O/P EST MOD 30 MIN: CPT | Performed by: INTERNAL MEDICINE

## 2021-11-23 RX ORDER — IPRATROPIUM BROMIDE AND ALBUTEROL SULFATE 2.5; .5 MG/3ML; MG/3ML
3 SOLUTION RESPIRATORY (INHALATION) EVERY 4 HOURS PRN
Qty: 120 ML | Refills: 11 | Status: SHIPPED | OUTPATIENT
Start: 2021-11-23 | End: 2021-11-23

## 2021-11-23 RX ORDER — IPRATROPIUM BROMIDE AND ALBUTEROL SULFATE 2.5; .5 MG/3ML; MG/3ML
3 SOLUTION RESPIRATORY (INHALATION) EVERY 4 HOURS PRN
Qty: 360 ML | Refills: 3 | Status: SHIPPED | OUTPATIENT
Start: 2021-11-23 | End: 2021-11-24 | Stop reason: SDUPTHER

## 2021-11-23 RX ORDER — PREDNISONE 10 MG/1
TABLET ORAL
Qty: 40 TABLET | Refills: 0 | Status: SHIPPED | OUTPATIENT
Start: 2021-11-23 | End: 2022-01-03

## 2021-11-23 ASSESSMENT — ENCOUNTER SYMPTOMS
HEMOPTYSIS: 0
WEAKNESS: 0
FALLS: 0
MYALGIAS: 0
BLURRED VISION: 0
FEVER: 0
EYE PAIN: 0
NECK PAIN: 0
BACK PAIN: 0
DEPRESSION: 0
ORTHOPNEA: 0
DIZZINESS: 0
NAUSEA: 0
CLAUDICATION: 0
VOMITING: 0
WHEEZING: 1
TREMORS: 0
DIARRHEA: 0
SPEECH CHANGE: 0
STRIDOR: 0
COUGH: 1
EYE REDNESS: 0
EYE DISCHARGE: 0
SORE THROAT: 0
ABDOMINAL PAIN: 0
HEADACHES: 0
DIAPHORESIS: 0
SPUTUM PRODUCTION: 1
DOUBLE VISION: 0
SINUS PAIN: 0
FOCAL WEAKNESS: 0
PALPITATIONS: 0
HEARTBURN: 0
CONSTIPATION: 0
SHORTNESS OF BREATH: 1
PHOTOPHOBIA: 0
WEIGHT LOSS: 0
CHILLS: 0
PND: 0

## 2021-11-23 NOTE — PROGRESS NOTES
Chief Complaint   Patient presents with   • COPD     last seen 9/14/21 Dr. Rios   • Results     PFt, 6MW 10/28/21         HPI: This patient is a 76 y.o. female whom is followed in our clinic for COPD last seen by Dr. Rios on 9/14/21.  I saw the patient for initial consult on 7/2/2021.  She is a current tobacco smoker with greater than 50-pack-year history currently smoking 1/2 pack/day.  At the time of her initial consult she had presumed diagnosis of COPD but has never had full pulmonary function testing.  Lung cancer screening CT chest from March 15 of this year showed emphysematous changes with bibasilar atelectasis but no concerning nodules or lymphadenopathy.  She had been started on Anoro and was on this for some time prior to referral to us.  She was also using albuterol as needed.  Our plan was to order 6-minute walk test due to low normal oxygen in clinic and PFTs.  In the interim, she presented on 9/14 with symptoms of COPD exacerbation and was treated with doxycycline and prednisone.  She was then seen in urgent care on 10/18 and again treated with antibiotics and prednisone.  More recently symptoms worsened and she has been taking prednisone tablets that she had at home, 40 mg for 3 days, 30 mg for 3 days and today is her first day on 20 mg.  Symptoms are persistent shortness of breath with associated wheezing, cough productive of clear mucus.  No fevers or chills.  No chest pain.  No imaging has been ordered over the past 3 months.  Pulmonary function testing to confirm airflow obstruction with FEV1/FVC ratio 46 and FEV1 of 0.99 L or 52% predicted, borderline hyperinflation with air trapping, RV of 162% predicted and reduced DLCO 66% predicted.    History reviewed. No pertinent past medical history.    Social History     Socioeconomic History   • Marital status: Single     Spouse name: Not on file   • Number of children: Not on file   • Years of education: Not on file   • Highest education level:  Not on file   Occupational History   • Not on file   Tobacco Use   • Smoking status: Current Every Day Smoker     Packs/day: 0.50     Years: 55.00     Pack years: 27.50     Types: Cigarettes     Start date: 1965   • Smokeless tobacco: Never Used   • Tobacco comment: 5 cigs a day-cgs 11/23/21    Vaping Use   • Vaping Use: Former   • Substances: Nicotine   • Passive vaping exposure: Yes   Substance and Sexual Activity   • Alcohol use: Yes     Comment: rarely    • Drug use: Never   • Sexual activity: Not on file   Other Topics Concern   • Not on file   Social History Narrative   • Not on file     Social Determinants of Health     Financial Resource Strain:    • Difficulty of Paying Living Expenses: Not on file   Food Insecurity:    • Worried About Running Out of Food in the Last Year: Not on file   • Ran Out of Food in the Last Year: Not on file   Transportation Needs:    • Lack of Transportation (Medical): Not on file   • Lack of Transportation (Non-Medical): Not on file   Physical Activity:    • Days of Exercise per Week: Not on file   • Minutes of Exercise per Session: Not on file   Stress:    • Feeling of Stress : Not on file   Social Connections:    • Frequency of Communication with Friends and Family: Not on file   • Frequency of Social Gatherings with Friends and Family: Not on file   • Attends [employer] Services: Not on file   • Active Member of Clubs or Organizations: Not on file   • Attends Club or Organization Meetings: Not on file   • Marital Status: Not on file   Intimate Partner Violence:    • Fear of Current or Ex-Partner: Not on file   • Emotionally Abused: Not on file   • Physically Abused: Not on file   • Sexually Abused: Not on file   Housing Stability:    • Unable to Pay for Housing in the Last Year: Not on file   • Number of Places Lived in the Last Year: Not on file   • Unstable Housing in the Last Year: Not on file       History reviewed. No pertinent family history.    Current Outpatient  Medications on File Prior to Visit   Medication Sig Dispense Refill   • guaiFENesin ER (MUCINEX) 600 MG TABLET SR 12 HR Take 600 mg by mouth every 12 hours.     • albuterol 108 (90 Base) MCG/ACT Aero Soln inhalation aerosol Inhale 1-2 Puffs every four hours as needed for Shortness of Breath. 1 Each 5   • atorvastatin (LIPITOR) 10 MG Tab Take 10 mg by mouth every evening.     • Cholecalciferol (VITAMIN D3) 25 MCG Tab Take  by mouth every day.     • B Complex-C (SUPER B COMPLEX/VITAMIN C) Tab Take  by mouth every day.     • Multiple Vitamins-Minerals (PRESERVISION AREDS 2 PO) Take  by mouth every day.     • Ibuprofen (MOTRIN PO) Take 400 mg by mouth every day.     • amLODIPine (NORVASC) 5 MG Tab Take 5 mg by mouth every day.     • ibandronate (BONIVA) 150 MG tablet PLEASE SEE ATTACHED FOR DETAILED DIRECTIONS     • liothyronine (CYTOMEL) 5 MCG Tab Take 10 mcg by mouth every day.     • losartan (COZAAR) 100 MG Tab Take 100 mg by mouth every day.     • triamterene-hctz (MAXZIDE-25/DYAZIDE) 37.5-25 MG Tab Take 1 Tablet by mouth every day.       No current facility-administered medications on file prior to visit.       Patient has no known allergies.      ROS:   Review of Systems   Constitutional: Negative for chills, diaphoresis, fever, malaise/fatigue and weight loss.   HENT: Negative for congestion, ear discharge, ear pain, hearing loss, nosebleeds, sinus pain, sore throat and tinnitus.    Eyes: Negative for blurred vision, double vision, photophobia, pain, discharge and redness.   Respiratory: Positive for cough, sputum production, shortness of breath and wheezing. Negative for hemoptysis and stridor.    Cardiovascular: Negative for chest pain, palpitations, orthopnea, claudication, leg swelling and PND.   Gastrointestinal: Negative for abdominal pain, constipation, diarrhea, heartburn, nausea and vomiting.   Genitourinary: Negative for dysuria and urgency.   Musculoskeletal: Negative for back pain, falls, joint pain,  "myalgias and neck pain.   Skin: Negative for itching and rash.   Neurological: Negative for dizziness, tremors, speech change, focal weakness, weakness and headaches.   Endo/Heme/Allergies: Negative for environmental allergies.   Psychiatric/Behavioral: Negative for depression.       /64 (BP Location: Right arm, Patient Position: Sitting, BP Cuff Size: Adult)   Pulse 82   Temp 36.7 °C (98.1 °F) (Temporal)   Resp 16   Ht 1.549 m (5' 1\")   Wt 49 kg (108 lb)   SpO2 90%   Physical Exam  Vitals reviewed.   Constitutional:       General: She is not in acute distress.     Appearance: Normal appearance. She is well-developed and normal weight.   HENT:      Head: Normocephalic and atraumatic.      Right Ear: External ear normal.      Left Ear: External ear normal.      Nose: Nose normal. No congestion.      Mouth/Throat:      Mouth: Mucous membranes are moist.      Pharynx: Oropharynx is clear. No oropharyngeal exudate.   Eyes:      General: No scleral icterus.     Extraocular Movements: Extraocular movements intact.      Conjunctiva/sclera: Conjunctivae normal.      Pupils: Pupils are equal, round, and reactive to light.   Neck:      Vascular: No JVD.      Trachea: No tracheal deviation.   Cardiovascular:      Rate and Rhythm: Normal rate and regular rhythm.      Heart sounds: Normal heart sounds. No murmur heard.  No friction rub. No gallop.    Pulmonary:      Effort: Pulmonary effort is normal. No accessory muscle usage or respiratory distress.      Breath sounds: No wheezing or rales.      Comments: Bronchial bs LLL  Abdominal:      General: There is no distension.      Palpations: Abdomen is soft.      Tenderness: There is no abdominal tenderness.   Musculoskeletal:         General: No tenderness or deformity. Normal range of motion.      Cervical back: Normal range of motion and neck supple.      Right lower leg: No edema.      Left lower leg: No edema.   Lymphadenopathy:      Cervical: No cervical " adenopathy.   Skin:     General: Skin is warm and dry.      Findings: No rash.      Nails: There is no clubbing.   Neurological:      Mental Status: She is alert and oriented to person, place, and time.      Cranial Nerves: No cranial nerve deficit.      Gait: Gait normal.   Psychiatric:         Mood and Affect: Mood normal.         Behavior: Behavior normal.         PFTs as reviewed by me personally: as per hPI    Imaging as reviewed by me personally:  As per hPI    Assessment:  1. Chronic obstructive pulmonary disease, unspecified COPD type (HCC)  Fluticasone-Umeclidin-Vilant (TRELEGY ELLIPTA) 200-62.5-25 MCG/INH AEROSOL POWDER, BREATH ACTIVATED    Fluticasone-Umeclidin-Vilant (TRELEGY ELLIPTA) 200-62.5-25 MCG/INH AEROSOL POWDER, BREATH ACTIVATED   2. Tobacco use     3. SOB (shortness of breath)  DX-CHEST-2 VIEWS       Plan:  1.  Patient has moderate COPD based on pulmonary function testing, is an active tobacco smoker and now presents with recurrent exacerbation over the past several months.  I am not sure if there is an element of reactive airways disease associated with this but will obtain labs done at [facility] in October to evaluate for eosinophilia.  In the meantime she will complete her current steroid taper and I gave her an additional longer taper if necessary while we start Trelegy 200.  I do think she needs continued LABA/LAMA but now inhaled corticosteroid is indicated and likely at higher dose.  She was counseled on tobacco cessation.  2.  Patient has cut back is currently only smoking 5 cigarettes/day.  Encouraged complete abstinence.  She is due for surveillance imaging in March.  3.  Presumed COPD exacerbation.  Treatment as per above.  She has received 2 courses of antibiotics so I did not give her any additional but she does have bronchial breath sounds in the left lower lobe for which I have ordered chest x-ray.  Return in about 3 months (around 2/23/2022) for COPD.

## 2021-11-24 DIAGNOSIS — R06.02 SOB (SHORTNESS OF BREATH): ICD-10-CM

## 2021-11-24 RX ORDER — IPRATROPIUM BROMIDE AND ALBUTEROL SULFATE 2.5; .5 MG/3ML; MG/3ML
3 SOLUTION RESPIRATORY (INHALATION) EVERY 4 HOURS PRN
Qty: 360 ML | Refills: 3 | Status: SHIPPED | OUTPATIENT
Start: 2021-11-24 | End: 2022-02-22

## 2021-11-24 NOTE — TELEPHONE ENCOUNTER
Patients pharmacy called and needs a diagnoses code in order to bill the [employer] insurance. The pharmacist would not take it over the phone so we will need to resend the Rx for Duoneb and include a diagnosis code. Rx pended.

## 2022-01-03 RX ORDER — PREDNISONE 10 MG/1
TABLET ORAL
Qty: 40 TABLET | Refills: 0 | Status: SHIPPED | OUTPATIENT
Start: 2022-01-03 | End: 2022-02-22

## 2022-01-03 NOTE — TELEPHONE ENCOUNTER
Caller Name: Rose Oshea                 Call Back Number: [phone] (home)         Patient approves a detailed voicemail message: N\A    Have we ever prescribed this med? Yes.  If yes, what date? 11/23/21     Last OV: 11/23/21 Dr. Mckeon     Next OV: 4/11/21 Dr. Mckeon     DX: Chronic obstructive pulmonary disease, unspecified COPD type (HCC)    Medications:  Requested Prescriptions     Pending Prescriptions Disp Refills   • predniSONE (DELTASONE) 10 MG Tab [Pharmacy Med Name: PREDNISONE 10 MG TABLET] 40 Tablet 0     Sig: TAKE 4 TABS DAILY X 4 DAYS, THEN TAKE 3 TABS DAILY X 4 DAYS, THEN TAKE 2 TABS DAILY X 4 DAYS, THEN 1 TAB DAILY X 4 DAYS WITH FOOD, THEN DISCONTINUE.       I spoke to the patient, needs on hand used last RX 12/23/21 almost completed.

## 2022-01-17 ENCOUNTER — OFFICE VISIT (OUTPATIENT)
Dept: SLEEP MEDICINE | Facility: MEDICAL CENTER | Age: 77
End: 2022-01-17
Payer: MEDICARE

## 2022-01-17 VITALS
BODY MASS INDEX: 19.63 KG/M2 | TEMPERATURE: 97.3 F | SYSTOLIC BLOOD PRESSURE: 126 MMHG | OXYGEN SATURATION: 90 % | HEART RATE: 85 BPM | DIASTOLIC BLOOD PRESSURE: 64 MMHG | WEIGHT: 104 LBS | RESPIRATION RATE: 16 BRPM | HEIGHT: 61 IN

## 2022-01-17 DIAGNOSIS — Z72.0 TOBACCO USE: ICD-10-CM

## 2022-01-17 DIAGNOSIS — J44.9 CHRONIC OBSTRUCTIVE PULMONARY DISEASE, UNSPECIFIED COPD TYPE (HCC): ICD-10-CM

## 2022-01-17 DIAGNOSIS — F17.210 NICOTINE DEPENDENCE, CIGARETTES, UNCOMPLICATED: ICD-10-CM

## 2022-01-17 PROCEDURE — 99214 OFFICE O/P EST MOD 30 MIN: CPT | Performed by: INTERNAL MEDICINE

## 2022-01-17 PROCEDURE — 93000 ELECTROCARDIOGRAM COMPLETE: CPT | Performed by: INTERNAL MEDICINE

## 2022-01-17 RX ORDER — POLYETHYLENE GLYCOL 3350 17 G
2 POWDER IN PACKET (EA) ORAL
Qty: 168 LOZENGE | Refills: 3 | Status: SHIPPED | OUTPATIENT
Start: 2022-01-17

## 2022-01-17 ASSESSMENT — ENCOUNTER SYMPTOMS
WHEEZING: 0
FEVER: 0
FOCAL WEAKNESS: 0
PND: 0
TREMORS: 0
HEARTBURN: 0
WEIGHT LOSS: 0
STRIDOR: 0
ABDOMINAL PAIN: 0
EYE DISCHARGE: 0
EYE PAIN: 0
DEPRESSION: 0
BACK PAIN: 0
DIZZINESS: 0
DOUBLE VISION: 0
DIAPHORESIS: 0
CLAUDICATION: 0
VOMITING: 0
CHILLS: 0
COUGH: 0
DIARRHEA: 0
ORTHOPNEA: 0
SPEECH CHANGE: 0
NAUSEA: 0
MYALGIAS: 0
HEADACHES: 0
FALLS: 0
SHORTNESS OF BREATH: 0
CONSTIPATION: 0
NECK PAIN: 0
PALPITATIONS: 0
WEAKNESS: 0
SINUS PAIN: 0
BLURRED VISION: 0
HEMOPTYSIS: 0
EYE REDNESS: 0
SORE THROAT: 0
PHOTOPHOBIA: 0
SPUTUM PRODUCTION: 0

## 2022-01-18 NOTE — PROGRESS NOTES
Chief Complaint   Patient presents with   • COPD     last seen 11/23/21 NO CXR          HPI: This patient is a 76 y.o. female whom is followed in our clinic for COPD last seen by me on 11/23/21.  She is a current tobacco smoker with greater than 50-pack-year history currently smoking 1/2 pack/day. Lung cancer screening CT chest from March 15 of this year showed emphysematous changes with bibasilar atelectasis but no concerning nodules or lymphadenopathy.  She had been started on Anoro and was on this for some time prior to referral to us which we have since escalated to Trelegy 200 and prn TITUS via [facility] and nebulizer. She has been tx for acute exacerbation 3 times in the last 6 mos and is wheezing on exam today. She has borderline desaturation on 6 MWT to 89% helena and PFTs from 10/28/21 showed FEV1/FVC ratio 46 and FEV1 of 0.99 L or 52% predicted, borderline hyperinflation with air trapping, RV of 162% predicted and reduced DLCO 66% predicted.   She is SOB with minimal activity with MMRC 1.  She is having SOB with activity particularly in the morning but improves through out the day.     History reviewed. No pertinent past medical history.    Social History     Socioeconomic History   • Marital status: Single     Spouse name: Not on file   • Number of children: Not on file   • Years of education: Not on file   • Highest education level: Not on file   Occupational History   • Not on file   Tobacco Use   • Smoking status: Current Every Day Smoker     Packs/day: 0.50     Years: 55.00     Pack years: 27.50     Types: Cigarettes     Start date: 1965   • Smokeless tobacco: Never Used   • Tobacco comment: 10 cigs-cgs 1/17/22    Vaping Use   • Vaping Use: Former   • Substances: Nicotine   • Passive vaping exposure: Yes   Substance and Sexual Activity   • Alcohol use: Yes     Comment: rarely    • Drug use: Never   • Sexual activity: Not on file   Other Topics Concern   • Not on file   Social History Narrative   • Not on file      Social Determinants of Health     Financial Resource Strain:    • Difficulty of Paying Living Expenses: Not on file   Food Insecurity:    • Worried About Running Out of Food in the Last Year: Not on file   • Ran Out of Food in the Last Year: Not on file   Transportation Needs:    • Lack of Transportation (Medical): Not on file   • Lack of Transportation (Non-Medical): Not on file   Physical Activity:    • Days of Exercise per Week: Not on file   • Minutes of Exercise per Session: Not on file   Stress:    • Feeling of Stress : Not on file   Social Connections:    • Frequency of Communication with Friends and Family: Not on file   • Frequency of Social Gatherings with Friends and Family: Not on file   • Attends Church Services: Not on file   • Active Member of Clubs or Organizations: Not on file   • Attends Club or Organization Meetings: Not on file   • Marital Status: Not on file   Intimate Partner Violence:    • Fear of Current or Ex-Partner: Not on file   • Emotionally Abused: Not on file   • Physically Abused: Not on file   • Sexually Abused: Not on file   Housing Stability:    • Unable to Pay for Housing in the Last Year: Not on file   • Number of Places Lived in the Last Year: Not on file   • Unstable Housing in the Last Year: Not on file       History reviewed. No pertinent family history.    Current Outpatient Medications on File Prior to Visit   Medication Sig Dispense Refill   • predniSONE (DELTASONE) 10 MG Tab TAKE 4 TABS DAILY X 4 DAYS, THEN TAKE 3 TABS DAILY X 4 DAYS, THEN TAKE 2 TABS DAILY X 4 DAYS, THEN 1 TAB DAILY X 4 DAYS WITH FOOD, THEN DISCONTINUE. 40 Tablet 0   • ipratropium-albuterol (DUONEB) 0.5-2.5 (3) MG/3ML nebulizer solution Take 3 mL by nebulization every four hours as needed for Shortness of Breath. Code J44.1 COPD 360 mL 3   • Fluticasone-Umeclidin-Vilant (TRELEGY ELLIPTA) 200-62.5-25 MCG/INH AEROSOL POWDER, BREATH ACTIVATED Inhale 1 Puff every day. 1 Each 6   • guaiFENesin ER  (MUCINEX) 600 MG TABLET SR 12 HR Take 600 mg by mouth every 12 hours.     • albuterol 108 (90 Base) MCG/ACT Aero Soln inhalation aerosol Inhale 1-2 Puffs every four hours as needed for Shortness of Breath. 1 Each 5   • atorvastatin (LIPITOR) 10 MG Tab Take 10 mg by mouth every evening.     • Cholecalciferol (VITAMIN D3) 25 MCG Tab Take  by mouth every day.     • B Complex-C (SUPER B COMPLEX/VITAMIN C) Tab Take  by mouth every day.     • Multiple Vitamins-Minerals (PRESERVISION AREDS 2 PO) Take  by mouth every day.     • Ibuprofen (MOTRIN PO) Take 400 mg by mouth every day.     • amLODIPine (NORVASC) 5 MG Tab Take 5 mg by mouth every day.     • ibandronate (BONIVA) 150 MG tablet PLEASE SEE ATTACHED FOR DETAILED DIRECTIONS     • liothyronine (CYTOMEL) 5 MCG Tab Take 10 mcg by mouth every day.     • losartan (COZAAR) 100 MG Tab Take 100 mg by mouth every day.     • triamterene-hctz (MAXZIDE-25/DYAZIDE) 37.5-25 MG Tab Take 1 Tablet by mouth every day.     • Fluticasone-Umeclidin-Vilant (TRELEGY ELLIPTA) 200-62.5-25 MCG/INH AEROSOL POWDER, BREATH ACTIVATED Inhale 1 Puff every day. 1 Each 0     No current facility-administered medications on file prior to visit.       Patient has no known allergies.      ROS:   Review of Systems   Constitutional: Negative for chills, diaphoresis, fever, malaise/fatigue and weight loss.   HENT: Negative for congestion, ear discharge, ear pain, hearing loss, nosebleeds, sinus pain, sore throat and tinnitus.    Eyes: Negative for blurred vision, double vision, photophobia, pain, discharge and redness.   Respiratory: Negative for cough, hemoptysis, sputum production, shortness of breath, wheezing and stridor.    Cardiovascular: Negative for chest pain, palpitations, orthopnea, claudication, leg swelling and PND.   Gastrointestinal: Negative for abdominal pain, constipation, diarrhea, heartburn, nausea and vomiting.   Genitourinary: Negative for dysuria and urgency.   Musculoskeletal: Negative  "for back pain, falls, joint pain, myalgias and neck pain.   Skin: Negative for itching and rash.   Neurological: Negative for dizziness, tremors, speech change, focal weakness, weakness and headaches.   Endo/Heme/Allergies: Negative for environmental allergies.   Psychiatric/Behavioral: Negative for depression.       /64 (BP Location: Right arm, Patient Position: Sitting, BP Cuff Size: Adult)   Pulse 85   Temp 36.3 °C (97.3 °F) (Temporal)   Resp 16   Ht 1.549 m (5' 1\")   Wt 47.2 kg (104 lb)   SpO2 90%   Physical Exam  Vitals reviewed.   Constitutional:       General: She is not in acute distress.     Appearance: Normal appearance. She is well-developed and normal weight.   HENT:      Head: Normocephalic and atraumatic.      Right Ear: External ear normal.      Left Ear: External ear normal.      Nose: Nose normal. No congestion.      Mouth/Throat:      Mouth: Mucous membranes are moist.      Pharynx: Oropharynx is clear. No oropharyngeal exudate.   Eyes:      General: No scleral icterus.     Extraocular Movements: Extraocular movements intact.      Conjunctiva/sclera: Conjunctivae normal.      Pupils: Pupils are equal, round, and reactive to light.   Neck:      Vascular: No JVD.      Trachea: No tracheal deviation.   Cardiovascular:      Rate and Rhythm: Normal rate and regular rhythm.      Heart sounds: Normal heart sounds. No murmur heard.  No friction rub. No gallop.    Pulmonary:      Effort: No accessory muscle usage or respiratory distress.      Breath sounds: Wheezing present. No rales.      Comments: Diffuse expiratory wheezing  Abdominal:      General: There is no distension.      Palpations: Abdomen is soft.      Tenderness: There is no abdominal tenderness.   Musculoskeletal:         General: No tenderness or deformity. Normal range of motion.      Cervical back: Normal range of motion and neck supple.      Right lower leg: No edema.      Left lower leg: No edema.   Lymphadenopathy:      " Cervical: No cervical adenopathy.   Skin:     General: Skin is warm and dry.      Findings: No rash.      Nails: There is no clubbing.   Neurological:      Mental Status: She is alert and oriented to person, place, and time.      Cranial Nerves: No cranial nerve deficit.      Gait: Gait normal.   Psychiatric:         Mood and Affect: Mood normal.         Behavior: Behavior normal.         PFTs as reviewed by me personally: as per HPI    Imaging as reviewed by me personally:  As per HPI    Assessment:  1. Chronic obstructive pulmonary disease, unspecified COPD type (HCC)  Referral to Pulmonary Rehab    EKG - Clinic Performed   2. Tobacco use  CT-LUNG CANCER-SCREENING   3. Nicotine dependence, cigarettes, uncomplicated   CT-LUNG CANCER-SCREENING       Plan:  1. This is chronic and moderate to severe based on PFTs. Given recurrent exacerbation I would like to consider daily low dose azithromycin but would like to get ECG first. Continue Trelegy 200, chani. Counseled on tobacco cessastion. We also ordered pulmonary rehab referral.   2. Due for lung Ca screening in March. Counseled on cessation  3. dupplicate dx; see above  Return in about 4 months (around 5/17/2022) for ECG, lung cancer CT .

## 2022-01-28 ENCOUNTER — HOSPITAL ENCOUNTER (OUTPATIENT)
Dept: RADIOLOGY | Facility: MEDICAL CENTER | Age: 77
End: 2022-01-28
Attending: INTERNAL MEDICINE
Payer: MEDICARE

## 2022-01-28 DIAGNOSIS — R06.02 SOB (SHORTNESS OF BREATH): ICD-10-CM

## 2022-01-28 PROCEDURE — 71046 X-RAY EXAM CHEST 2 VIEWS: CPT

## 2022-02-22 ENCOUNTER — PATIENT MESSAGE (OUTPATIENT)
Dept: SLEEP MEDICINE | Facility: MEDICAL CENTER | Age: 77
End: 2022-02-22
Payer: MEDICARE

## 2022-02-22 DIAGNOSIS — R06.02 SOB (SHORTNESS OF BREATH): ICD-10-CM

## 2022-02-22 DIAGNOSIS — J44.9 CHRONIC OBSTRUCTIVE PULMONARY DISEASE, UNSPECIFIED COPD TYPE (HCC): ICD-10-CM

## 2022-02-22 RX ORDER — IPRATROPIUM BROMIDE AND ALBUTEROL SULFATE 2.5; .5 MG/3ML; MG/3ML
SOLUTION RESPIRATORY (INHALATION)
Qty: 450 ML | Refills: 2 | Status: SHIPPED | OUTPATIENT
Start: 2022-02-22 | End: 2022-02-22

## 2022-02-22 RX ORDER — PREDNISONE 10 MG/1
TABLET ORAL
Qty: 40 TABLET | Refills: 0 | Status: SHIPPED | OUTPATIENT
Start: 2022-02-22 | End: 2022-04-18

## 2022-02-22 NOTE — TELEPHONE ENCOUNTER
Caller Name: Rose Oshea                 Call Back Number: [phone] (home)         Patient approves a detailed voicemail message: N\A    Have we ever prescribed this med? Yes.  If yes, what date? 1/3/2022     Last OV: 1/17/22 Dr. Mckeon     Next OV: 6/8/22 Dr. Mckeon     DX: COPD    Medications:  Current Outpatient Medications   Medication Sig Dispense Refill   • nicotine polacrilex (NICORETTE) 2 MG lozenge Place 1 Lozenge into mouth between cheek and gum every 3 hours as needed (when desiring to smoke). 168 Lozenge 3   • predniSONE (DELTASONE) 10 MG Tab TAKE 4 TABS DAILY X 4 DAYS, THEN TAKE 3 TABS DAILY X 4 DAYS, THEN TAKE 2 TABS DAILY X 4 DAYS, THEN 1 TAB DAILY X 4 DAYS WITH FOOD, THEN DISCONTINUE. 40 Tablet 0   • ipratropium-albuterol (DUONEB) 0.5-2.5 (3) MG/3ML nebulizer solution Take 3 mL by nebulization every four hours as needed for Shortness of Breath. Code J44.1 COPD 360 mL 3   • Fluticasone-Umeclidin-Vilant (TRELEGY ELLIPTA) 200-62.5-25 MCG/INH AEROSOL POWDER, BREATH ACTIVATED Inhale 1 Puff every day. 1 Each 6   • Fluticasone-Umeclidin-Vilant (TRELEGY ELLIPTA) 200-62.5-25 MCG/INH AEROSOL POWDER, BREATH ACTIVATED Inhale 1 Puff every day. 1 Each 0   • guaiFENesin ER (MUCINEX) 600 MG TABLET SR 12 HR Take 600 mg by mouth every 12 hours.     • albuterol 108 (90 Base) MCG/ACT Aero Soln inhalation aerosol Inhale 1-2 Puffs every four hours as needed for Shortness of Breath. 1 Each 5   • atorvastatin (LIPITOR) 10 MG Tab Take 10 mg by mouth every evening.     • Cholecalciferol (VITAMIN D3) 25 MCG Tab Take  by mouth every day.     • B Complex-C (SUPER B COMPLEX/VITAMIN C) Tab Take  by mouth every day.     • Multiple Vitamins-Minerals (PRESERVISION AREDS 2 PO) Take  by mouth every day.     • Ibuprofen (MOTRIN PO) Take 400 mg by mouth every day.     • amLODIPine (NORVASC) 5 MG Tab Take 5 mg by mouth every day.     • ibandronate (BONIVA) 150 MG tablet PLEASE SEE ATTACHED FOR DETAILED DIRECTIONS     • liothyronine  (CYTOMEL) 5 MCG Tab Take 10 mcg by mouth every day.     • losartan (COZAAR) 100 MG Tab Take 100 mg by mouth every day.     • triamterene-hctz (MAXZIDE-25/DYAZIDE) 37.5-25 MG Tab Take 1 Tablet by mouth every day.       No current facility-administered medications for this visit.

## 2022-02-22 NOTE — TELEPHONE ENCOUNTER
Prednisone  2/22/2022 12:08 PM Reply    To: Kenya Pascual, Med Ass't      From: Rose Oshea      Created: 2/22/2022 12:08 PM        Yes, I would like to have another course of Prednisone on hand. Just finishing a course tomorrow. Still Having shortness of breath & sputum is yellow, especially in the mornings

## 2022-02-22 NOTE — TELEPHONE ENCOUNTER
Have we ever prescribed this med? Yes.  If yes, what date? 11/24/21    Last OV: 01/17/22 with Dr Mckeon    Next OV: 06/08/22 with Dr Mckeon    DX: SOB    Medications:   Requested Prescriptions     Pending Prescriptions Disp Refills   • ipratropium-albuterol (DUONEB) 0.5-2.5 (3) MG/3ML nebulizer solution [Pharmacy Med Name: IPRAT-ALBUT 0.5-3(2.5) MG/3 ML] 450 mL 2     Sig: TAKE 3 ML BY NEBULIZATION EVERY FOUR HOURS AS NEEDED FOR SHORTNESS OF BREATH.

## 2022-02-28 RX ORDER — LEVALBUTEROL INHALATION SOLUTION 1.25 MG/3ML
1.25 SOLUTION RESPIRATORY (INHALATION) EVERY 4 HOURS PRN
Qty: 120 ML | Refills: 11 | Status: SHIPPED | OUTPATIENT
Start: 2022-02-28 | End: 2022-03-01

## 2022-03-01 RX ORDER — LEVALBUTEROL 1.25 MG/.5ML
1.25 SOLUTION, CONCENTRATE RESPIRATORY (INHALATION) EVERY 4 HOURS PRN
Qty: 60 ML | Refills: 3 | Status: SHIPPED | OUTPATIENT
Start: 2022-03-01 | End: 2022-03-22

## 2022-03-01 NOTE — TELEPHONE ENCOUNTER
Changes Requested     levalbuterol (XOPENEX) 1.25 MG/0.5ML nebulizer solution         Changed from: levalbuterol (XOPENEX) 1.25 MG/3ML Nebu Soln    All pharmacy suggested alternatives are listed below    Sig: N/A    Disp:  Not specified    Refills:  0    Start: 2/28/2022    Class: Normal    Non-formulary    Last ordered: Yesterday by Cristiana Mckeon M.D. Last refill: 2/28/2022    Rx #: [id number]    Pharmacy comment: Alternative Requested:PLEASE SEND ALT.   All Pharmacy Suggested Alternatives:  0   levalbuterol (XOPENEX) 1.25 MG/0.5ML nebulizer solution  0 albuterol (PROVENTIL) 2.5mg/3ml Nebu Soln solution for nebulization  0 albuterol (ALBUTEROL SULFATE) 2.5mg/0.5ml Nebu Soln   To prescribe one of the alternatives listed above, open the encounter and click Replace.  Open Encounter       To be filled at: [facility]/pharmacy #[id number] - [place] - [address]

## 2022-03-01 NOTE — TELEPHONE ENCOUNTER
Caller Name: Rose Oshea                 Call Back Number: [phone] (home)         Patient approves a detailed voicemail message: N\A    Have we ever prescribed this med? Yes.  If yes, what date? 9/14/21     Last OV: 1/17/22 Dr. Mckeon     Next OV: 6/8/22 Dr. Mckeon     DX: Chronic obstructive pulmonary disease, unspecified COPD type (HCC) (J44.9    Medications:  Current Outpatient Medications   Medication Sig Dispense Refill   • levalbuterol (XOPENEX) 1.25 MG/3ML Nebu Soln Take 3 mL by nebulization every four hours as needed for Shortness of Breath. 120 mL 11   • predniSONE (DELTASONE) 10 MG Tab TAKE 4 TABS DAILY X 4 DAYS, THEN TAKE 3 TABS DAILY X 4 DAYS, THEN TAKE 2 TABS DAILY X 4 DAYS, THEN 1 TAB DAILY X 4 DAYS WITH FOOD, THEN DISCONTINUE. 40 Tablet 0   • ipratropium (ATROVENT) 0.02 % Solution Take 1 mL by nebulization every four hours as needed (SOB or cough). 120 mL 11   • nicotine polacrilex (NICORETTE) 2 MG lozenge Place 1 Lozenge into mouth between cheek and gum every 3 hours as needed (when desiring to smoke). 168 Lozenge 3   • Fluticasone-Umeclidin-Vilant (TRELEGY ELLIPTA) 200-62.5-25 MCG/INH AEROSOL POWDER, BREATH ACTIVATED Inhale 1 Puff every day. 1 Each 6   • Fluticasone-Umeclidin-Vilant (TRELEGY ELLIPTA) 200-62.5-25 MCG/INH AEROSOL POWDER, BREATH ACTIVATED Inhale 1 Puff every day. 1 Each 0   • guaiFENesin ER (MUCINEX) 600 MG TABLET SR 12 HR Take 600 mg by mouth every 12 hours.     • albuterol 108 (90 Base) MCG/ACT Aero Soln inhalation aerosol Inhale 1-2 Puffs every four hours as needed for Shortness of Breath. 1 Each 5   • atorvastatin (LIPITOR) 10 MG Tab Take 10 mg by mouth every evening.     • Cholecalciferol (VITAMIN D3) 25 MCG Tab Take  by mouth every day.     • B Complex-C (SUPER B COMPLEX/VITAMIN C) Tab Take  by mouth every day.     • Multiple Vitamins-Minerals (PRESERVISION AREDS 2 PO) Take  by mouth every day.     • Ibuprofen (MOTRIN PO) Take 400 mg by mouth every day.     • amLODIPine  (NORVASC) 5 MG Tab Take 5 mg by mouth every day.     • ibandronate (BONIVA) 150 MG tablet PLEASE SEE ATTACHED FOR DETAILED DIRECTIONS     • liothyronine (CYTOMEL) 5 MCG Tab Take 10 mcg by mouth every day.     • losartan (COZAAR) 100 MG Tab Take 100 mg by mouth every day.     • triamterene-hctz (MAXZIDE-25/DYAZIDE) 37.5-25 MG Tab Take 1 Tablet by mouth every day.       No current facility-administered medications for this visit.

## 2022-03-14 DIAGNOSIS — J44.9 CHRONIC OBSTRUCTIVE PULMONARY DISEASE, UNSPECIFIED COPD TYPE (HCC): ICD-10-CM

## 2022-03-14 DIAGNOSIS — R06.02 SOB (SHORTNESS OF BREATH): ICD-10-CM

## 2022-03-14 RX ORDER — IPRATROPIUM BROMIDE AND ALBUTEROL SULFATE 2.5; .5 MG/3ML; MG/3ML
3 SOLUTION RESPIRATORY (INHALATION) EVERY 4 HOURS PRN
Qty: 360 ML | Refills: 3 | Status: SHIPPED | OUTPATIENT
Start: 2022-03-14 | End: 2022-03-22

## 2022-03-14 NOTE — TELEPHONE ENCOUNTER
Caller Name: Rose Oshea                 Call Back Number: [phone] (home)         Patient approves a detailed voicemail message: N\A    Have we ever prescribed this med? Yes.  If yes, what date? 2/22/22     Last OV: 1/17/22 Dr. Mckeon     Next OV: 6/8/22 Dr. Mckeon     DX: COPD     Medications:  Current Outpatient Medications   Medication Sig Dispense Refill   • levalbuterol (XOPENEX) 1.25 MG/0.5ML nebulizer solution Take 0.5 mL by nebulization every four hours as needed. 60 mL 3   • predniSONE (DELTASONE) 10 MG Tab TAKE 4 TABS DAILY X 4 DAYS, THEN TAKE 3 TABS DAILY X 4 DAYS, THEN TAKE 2 TABS DAILY X 4 DAYS, THEN 1 TAB DAILY X 4 DAYS WITH FOOD, THEN DISCONTINUE. 40 Tablet 0   • ipratropium (ATROVENT) 0.02 % Solution Take 1 mL by nebulization every four hours as needed (SOB or cough). 120 mL 11   • nicotine polacrilex (NICORETTE) 2 MG lozenge Place 1 Lozenge into mouth between cheek and gum every 3 hours as needed (when desiring to smoke). 168 Lozenge 3   • Fluticasone-Umeclidin-Vilant (TRELEGY ELLIPTA) 200-62.5-25 MCG/INH AEROSOL POWDER, BREATH ACTIVATED Inhale 1 Puff every day. 1 Each 6   • Fluticasone-Umeclidin-Vilant (TRELEGY ELLIPTA) 200-62.5-25 MCG/INH AEROSOL POWDER, BREATH ACTIVATED Inhale 1 Puff every day. 1 Each 0   • guaiFENesin ER (MUCINEX) 600 MG TABLET SR 12 HR Take 600 mg by mouth every 12 hours.     • albuterol 108 (90 Base) MCG/ACT Aero Soln inhalation aerosol Inhale 1-2 Puffs every four hours as needed for Shortness of Breath. 1 Each 5   • atorvastatin (LIPITOR) 10 MG Tab Take 10 mg by mouth every evening.     • Cholecalciferol (VITAMIN D3) 25 MCG Tab Take  by mouth every day.     • B Complex-C (SUPER B COMPLEX/VITAMIN C) Tab Take  by mouth every day.     • Multiple Vitamins-Minerals (PRESERVISION AREDS 2 PO) Take  by mouth every day.     • Ibuprofen (MOTRIN PO) Take 400 mg by mouth every day.     • amLODIPine (NORVASC) 5 MG Tab Take 5 mg by mouth every day.     • ibandronate (BONIVA) 150 MG  tablet PLEASE SEE ATTACHED FOR DETAILED DIRECTIONS     • liothyronine (CYTOMEL) 5 MCG Tab Take 10 mcg by mouth every day.     • losartan (COZAAR) 100 MG Tab Take 100 mg by mouth every day.     • triamterene-hctz (MAXZIDE-25/DYAZIDE) 37.5-25 MG Tab Take 1 Tablet by mouth every day.       No current facility-administered medications for this visit.

## 2022-03-17 ENCOUNTER — APPOINTMENT (OUTPATIENT)
Dept: RADIOLOGY | Facility: MEDICAL CENTER | Age: 77
End: 2022-03-17
Attending: INTERNAL MEDICINE
Payer: MEDICARE

## 2022-03-22 RX ORDER — IPRATROPIUM BROMIDE AND ALBUTEROL SULFATE 2.5; .5 MG/3ML; MG/3ML
3 SOLUTION RESPIRATORY (INHALATION) EVERY 4 HOURS PRN
Qty: 120 ML | Refills: 11 | Status: SHIPPED | OUTPATIENT
Start: 2022-03-22 | End: 2022-03-29

## 2022-03-29 RX ORDER — IPRATROPIUM BROMIDE AND ALBUTEROL SULFATE 2.5; .5 MG/3ML; MG/3ML
3 SOLUTION RESPIRATORY (INHALATION) EVERY 4 HOURS PRN
Qty: 120 ML | Refills: 11 | Status: SHIPPED | OUTPATIENT
Start: 2022-03-29 | End: 2022-09-01

## 2022-04-01 ENCOUNTER — HOSPITAL ENCOUNTER (OUTPATIENT)
Dept: RADIOLOGY | Facility: MEDICAL CENTER | Age: 77
End: 2022-04-01
Attending: INTERNAL MEDICINE
Payer: MEDICARE

## 2022-04-01 ENCOUNTER — TELEPHONE (OUTPATIENT)
Dept: SLEEP MEDICINE | Facility: MEDICAL CENTER | Age: 77
End: 2022-04-01

## 2022-04-01 DIAGNOSIS — F17.210 NICOTINE DEPENDENCE, CIGARETTES, UNCOMPLICATED: ICD-10-CM

## 2022-04-01 DIAGNOSIS — Z72.0 TOBACCO USE: ICD-10-CM

## 2022-04-01 PROCEDURE — 71271 CT THORAX LUNG CANCER SCR C-: CPT

## 2022-04-01 NOTE — TELEPHONE ENCOUNTER
VOICEMAIL  1. Caller Name: [facility]                     Call Back Number: [phone]    2. Message: [facility] rx from 9/29/21 needs ICD 10 code so they can fill.  Voicemail received on 3/27/22.     3. Patient approves office to leave a detailed voicemail/MyChart message: yes

## 2022-04-18 RX ORDER — PREDNISONE 10 MG/1
TABLET ORAL
Qty: 40 TABLET | Refills: 0 | Status: SHIPPED | OUTPATIENT
Start: 2022-04-18 | End: 2022-09-01

## 2022-04-18 NOTE — TELEPHONE ENCOUNTER
Caller Name: Rose Oshea                 Call Back Number: [phone] (home)         Patient approves a detailed voicemail message: N\A    Have we ever prescribed this med? Yes.  If yes, what date? 2/22/22     Last OV: 1/17/22 Dr. Mckeon     Next OV: 6/8/22 Dr. Mckeon     DX: COPD     Medications:  Prednisone     I spoke to the patient, she is having difficulty breathing, increase SOB, Wheezing, coughing, clear sputum. Using trelegy one inhalation daily, nebulizer albuterol solution every 4 hours hours, in between 2 inhalations of the Albuterol inhaler. Patient declined fever, chills, sweats, cheat pain, tightness and or pressure. Encouraged to the patient is symptoms worsen and or persist seek medical attention at the ER.

## 2022-04-21 RX ORDER — AZITHROMYCIN 250 MG/1
TABLET, FILM COATED ORAL
Qty: 6 TABLET | Refills: 0 | Status: SHIPPED | OUTPATIENT
Start: 2022-04-21 | End: 2022-05-24

## 2022-05-18 ENCOUNTER — PATIENT MESSAGE (OUTPATIENT)
Dept: SLEEP MEDICINE | Facility: MEDICAL CENTER | Age: 77
End: 2022-05-18
Payer: MEDICARE

## 2022-05-24 ENCOUNTER — OFFICE VISIT (OUTPATIENT)
Dept: SLEEP MEDICINE | Facility: MEDICAL CENTER | Age: 77
End: 2022-05-24
Payer: MEDICARE

## 2022-05-24 VITALS
WEIGHT: 98.19 LBS | OXYGEN SATURATION: 89 % | DIASTOLIC BLOOD PRESSURE: 64 MMHG | BODY MASS INDEX: 18.54 KG/M2 | HEART RATE: 76 BPM | SYSTOLIC BLOOD PRESSURE: 100 MMHG | HEIGHT: 61 IN

## 2022-05-24 DIAGNOSIS — F17.200 TOBACCO DEPENDENCY: ICD-10-CM

## 2022-05-24 DIAGNOSIS — J96.11 CHRONIC RESPIRATORY FAILURE WITH HYPOXIA (HCC): ICD-10-CM

## 2022-05-24 DIAGNOSIS — R91.8 PULMONARY NODULES: ICD-10-CM

## 2022-05-24 DIAGNOSIS — J44.1 COPD EXACERBATION (HCC): ICD-10-CM

## 2022-05-24 DIAGNOSIS — J43.1 PANLOBULAR EMPHYSEMA (HCC): ICD-10-CM

## 2022-05-24 PROCEDURE — 99214 OFFICE O/P EST MOD 30 MIN: CPT | Mod: 25

## 2022-05-24 PROCEDURE — 94761 N-INVAS EAR/PLS OXIMETRY MLT: CPT

## 2022-05-24 RX ORDER — AZITHROMYCIN 250 MG/1
250 TABLET, FILM COATED ORAL
Qty: 36 TABLET | Refills: 1 | Status: SHIPPED | OUTPATIENT
Start: 2022-05-25 | End: 2022-09-01

## 2022-05-24 ASSESSMENT — ENCOUNTER SYMPTOMS
WHEEZING: 1
DIAPHORESIS: 0
NAUSEA: 0
PALPITATIONS: 0
FALLS: 0
DIARRHEA: 0
VOMITING: 0
HEMOPTYSIS: 0
HEADACHES: 0
DIZZINESS: 0
SPUTUM PRODUCTION: 1
SHORTNESS OF BREATH: 1
CHILLS: 0
HEARTBURN: 0
COUGH: 1
SINUS PAIN: 0
WEAKNESS: 0
MYALGIAS: 0
FEVER: 0

## 2022-05-24 ASSESSMENT — PATIENT HEALTH QUESTIONNAIRE - PHQ9: CLINICAL INTERPRETATION OF PHQ2 SCORE: 0

## 2022-05-24 NOTE — ASSESSMENT & PLAN NOTE
PFTs in October 2021 showed FVC 2.17 L or 80%, FEV1 0.99 L or 52%, FEV1/FVC 46%, %, %, and DLCO 66% predicted.  She is currently on Trelegy, nebulizer x5 days, and albuterol 3-4 times a day.  She reports having to exacerbations or required antibiotics and steroids since January.  Gold group D.  -- Continue Trelegy  -- Continue nebulizer and albuterol as needed  -- Start patient on low-dose azithromycin to 50 mg Monday Wednesday Friday  -- Pulmonary rehab referral was sent at the last appointment.  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  -- Encourage smoking cessation  -- Prescription given to the patient, as she would like to purchase her own Inogen

## 2022-05-24 NOTE — PROGRESS NOTES
"Pulmonary Clinic Note    Date of Visit: 5/24/2022     Chief Complaint:  Chief Complaint   Patient presents with   • COPD     Last seen 01/17/22   • Results     CT-LCA 04/01/22, EKG 05/23/22   • Shortness of Breath     X2weeks. More with exertion     HPI:   Rose Oshea is a very pleasant 77 y.o. year old female current smoker (50+ pack-years), with a PMHx of COPD  who presented to the Pulmonary Clinic for a regular follow up. Last seen in the office on 1/17/2022 with Dr. Mckeon.     Pulmonary history per Dr. Mckeon's HPI:  \"Lung cancer screening CT chest from March 15 of this year showed emphysematous changes with bibasilar atelectasis but no concerning nodules or lymphadenopathy.  She had been started on Anoro and was on this for some time prior to referral to us which we have since escalated to Trelegy 200 and prn TITUS via MDI and nebulizer. She has been tx for acute exacerbation 3 times in the last 6 mos and is wheezing on exam today. She has borderline desaturation on 6 MWT to 89% helena and PFTs from 10/28/21 showed FEV1/FVC ratio 46 and FEV1 of 0.99 L or 52% predicted, borderline hyperinflation with air trapping, RV of 162% predicted and reduced DLCO 66% predicted.\"    Today she presents with her friend.  She states that she has been more short of breath, even while doing ADLs.  She has a persistent cough which intermittently produces white sputum.  She does have wheezing.  She does monitor her oxygen saturations at home and notices it to be in the 80s on rest and in the 70s upon exertion.  Multi ox in the office today shows need for 4 L on exertion.  She does report that her hands are cold at times.  She still using Trelegy.  She is using her albuterol 3-4 times a day and her nebulizer 5 times a day.  She states she has had 2 COPD exacerbations since January.  She is still currently smoking but has been able to cut down to 3 cigarettes a day.    Exacerbations since January: 2    MMRC Grade: 4- Breathless with " ambulating around house or ADLs    History reviewed. No pertinent past medical history.  History reviewed. No pertinent surgical history.  Social History     Socioeconomic History   • Marital status: Single     Spouse name: Not on file   • Number of children: Not on file   • Years of education: Not on file   • Highest education level: Not on file   Occupational History   • Not on file   Tobacco Use   • Smoking status: Current Every Day Smoker     Packs/day: 0.25     Years: 55.00     Pack years: 13.75     Types: Cigarettes     Start date: 1965   • Smokeless tobacco: Never Used   • Tobacco comment: 05/24/22-3 cig. per day with a couple of puffs on each one   Vaping Use   • Vaping Use: Former   • Substances: Nicotine   • Passive vaping exposure: Yes   Substance and Sexual Activity   • Alcohol use: Yes     Comment: rarely    • Drug use: Never   • Sexual activity: Not on file   Other Topics Concern   • Not on file   Social History Narrative   • Not on file     Social Determinants of Health     Financial Resource Strain: Not on file   Food Insecurity: Not on file   Transportation Needs: Not on file   Physical Activity: Not on file   Stress: Not on file   Social Connections: Not on file   Intimate Partner Violence: Not on file   Housing Stability: Not on file        History reviewed. No pertinent family history.  Current Outpatient Medications on File Prior to Visit   Medication Sig Dispense Refill   • ipratropium-albuterol (DUONEB) 0.5-2.5 (3) MG/3ML nebulizer solution Take 3 mL by nebulization every four hours as needed for Shortness of Breath. 120 mL 11   • Fluticasone-Umeclidin-Vilant (TRELEGY ELLIPTA) 200-62.5-25 MCG/INH AEROSOL POWDER, BREATH ACTIVATED Inhale 1 Puff every day. 1 Each 6   • guaiFENesin ER (MUCINEX) 600 MG TABLET SR 12 HR Take 600 mg by mouth every 12 hours.     • albuterol 108 (90 Base) MCG/ACT Aero Soln inhalation aerosol Inhale 1-2 Puffs every four hours as needed for Shortness of Breath. 1 Each 5    • atorvastatin (LIPITOR) 10 MG Tab Take 10 mg by mouth every evening.     • Cholecalciferol (VITAMIN D3) 25 MCG Tab Take  by mouth every day.     • Multiple Vitamins-Minerals (PRESERVISION AREDS 2 PO) Take  by mouth every day.     • Ibuprofen (MOTRIN PO) Take 400 mg by mouth every day.     • amLODIPine (NORVASC) 5 MG Tab Take 5 mg by mouth every day.     • ibandronate (BONIVA) 150 MG tablet PLEASE SEE ATTACHED FOR DETAILED DIRECTIONS     • liothyronine (CYTOMEL) 5 MCG Tab Take 10 mcg by mouth every day.     • losartan (COZAAR) 100 MG Tab Take 100 mg by mouth every day.     • triamterene-hctz (MAXZIDE-25/DYAZIDE) 37.5-25 MG Tab Take 1 Tablet by mouth every day.     • predniSONE (DELTASONE) 10 MG Tab TAKE 4 TABS DAILY FOR 4 DAYS, THEN TAKE 3 TABS DAILY FOR 4 DAYS, THEN TAKE 2 TABS DAILY FOR 4 DAYS, THEN 1 TAB DAILY FOR 4 DAYS WITH FOOD, THEN DISCONTINUE. 40 Tablet 0   • ipratropium (ATROVENT) 0.02 % Solution Take 1 mL by nebulization every four hours as needed (SOB or cough). 120 mL 11   • nicotine polacrilex (NICORETTE) 2 MG lozenge Place 1 Lozenge into mouth between cheek and gum every 3 hours as needed (when desiring to smoke). 168 Lozenge 3   • Fluticasone-Umeclidin-Vilant (TRELEGY ELLIPTA) 200-62.5-25 MCG/INH AEROSOL POWDER, BREATH ACTIVATED Inhale 1 Puff every day. 1 Each 0   • B Complex-C (SUPER B COMPLEX/VITAMIN C) Tab Take  by mouth every day.       No current facility-administered medications on file prior to visit.     Allergies: Patient has no known allergies.    ROS:   Review of Systems   Constitutional: Negative for chills, diaphoresis, fever and malaise/fatigue.   HENT: Negative for congestion and sinus pain.    Respiratory: Positive for cough, sputum production (small, white), shortness of breath and wheezing. Negative for hemoptysis.    Cardiovascular: Negative for chest pain, palpitations and leg swelling.   Gastrointestinal: Negative for diarrhea, heartburn, nausea and vomiting.   Musculoskeletal:  "Negative for falls and myalgias.   Neurological: Negative for dizziness, weakness and headaches.       Vitals:  /64 (BP Location: Left arm, Patient Position: Sitting, BP Cuff Size: Adult)   Pulse 76   Ht 1.549 m (5' 1\")   Wt 44.5 kg (98 lb 3 oz)   SpO2 89%     Physical Exam:  Physical Exam  Constitutional:       General: She is not in acute distress.     Appearance: Normal appearance. She is not ill-appearing, toxic-appearing or diaphoretic.   Cardiovascular:      Rate and Rhythm: Normal rate and regular rhythm.      Heart sounds: No murmur heard.    No friction rub. No gallop.   Pulmonary:      Effort: No respiratory distress.      Breath sounds: No stridor. Wheezing present. No rhonchi or rales.   Musculoskeletal:         General: No swelling.      Right lower leg: No edema.      Left lower leg: No edema.   Skin:     General: Skin is warm.   Neurological:      General: No focal deficit present.      Mental Status: She is alert and oriented to person, place, and time.   Psychiatric:         Mood and Affect: Mood normal.         Behavior: Behavior normal.         Thought Content: Thought content normal.         Judgment: Judgment normal.         Laboratory Data:  Multi ox (Date: 5/24/2022)-        PFTs (Date: 10/8/2021)-    Impression:  This study demonstrates the presence of moderate to severe obstructive lung disease.     CT Chest: (Date: 4/1/2022)-  Impression:  1. No new or suspicious pulmonary nodule.  2.  Calcified granulomas and calcified right hilar, mediastinal lymph nodes   Lungs:  Bilateral lungs are clear. No airspace opacity. Right upper lobe  calcified granulomas.  Emphysema: Moderate.  Fibrosis: None.       ECG: (Date: 5/23/2022)-      Assessment and Plan:    Problem List Items Addressed This Visit     Panlobular emphysema (HCC)     PFTs in October 2021 showed FVC 2.17 L or 80%, FEV1 0.99 L or 52%, FEV1/FVC 46%, %, %, and DLCO 66% predicted.  She is currently on Trelegy, " nebulizer x5 days, and albuterol 3-4 times a day.  She reports having to exacerbations or required antibiotics and steroids since January.  Gold group D.  -- Continue Trelegy  -- Continue nebulizer and albuterol as needed  -- Start patient on low-dose azithromycin to 50 mg Monday Wednesday Friday  -- Pulmonary rehab referral was sent at the last appointment.  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  -- Encourage smoking cessation  -- Prescription given to the patient, as she would like to purchase her own Inogen           Relevant Medications    azithromycin (ZITHROMAX) 250 MG Tab (Start on 5/25/2022)    Other Relevant Orders    Multiple Oximetry    DME O2 New Set Up    Chronic respiratory failure with hypoxia (HCC)     Multi ox in office today shows the need for 4 LPM on exertion.  Symptomatically, she states she is more short of breath on exertion.  She does monitor her oxygen saturations at home and notices on exertion her saturations are in the 70s and at rest they are in the 80s, but she does state her hands are cold at times.  -- DME in order placed for 4 LPM of oxygen on exertion and nocturnally.  -- Order placed for OPO to rule out nocturnal hypoxia           Relevant Orders    Multiple Oximetry    DME O2 New Set Up    Overnight Oximetry    COPD exacerbation (HCC)     As above.           Relevant Medications    azithromycin (ZITHROMAX) 250 MG Tab (Start on 5/25/2022)    Other Relevant Orders    Multiple Oximetry    DME O2 New Set Up    Pulmonary nodules     CT chest on 4/1/2022 showed no new suspicious pulmonary nodules.  There are calcified granulomas and calcified right hilar and lower mediastinal lymph nodes.  -- No further follow-up is required.           Tobacco dependency     Patient is a current everyday smoker.  She has decreased her cigarette use and taking down to 3 cigarettes a day.  -- Encouraged patient to quit smoking.                Diagnostic studies have been reviewed with the patient.    Return in about 3 months (around 8/24/2022), or if symptoms worsen or fail to improve.     This note was generated using voice recognition software which has a chance of producing errors of grammar and possibly content.  I have made every reasonable attempt to find and correct any obvious errors, but it should be expected that some may not be found prior to finalization of this note.    Time spent in record review prior to patient arrival, reviewing results, and in face-to-face encounter totaled 39  min.  __________  BRIGITTE Arciinega  Pulmonary Medicine  [facility]

## 2022-05-24 NOTE — ASSESSMENT & PLAN NOTE
Multi ox in office today shows the need for 4 LPM on exertion.  Symptomatically, she states she is more short of breath on exertion.  She does monitor her oxygen saturations at home and notices on exertion her saturations are in the 70s and at rest they are in the 80s, but she does state her hands are cold at times.  -- DME in order placed for 4 LPM of oxygen on exertion and nocturnally.  -- Order placed for OPO to rule out nocturnal hypoxia

## 2022-05-24 NOTE — ASSESSMENT & PLAN NOTE
CT chest on 4/1/2022 showed no new suspicious pulmonary nodules.  There are calcified granulomas and calcified right hilar and lower mediastinal lymph nodes.  -- No further follow-up is required.

## 2022-05-24 NOTE — ASSESSMENT & PLAN NOTE
Patient is a current everyday smoker.  She has decreased her cigarette use and taking down to 3 cigarettes a day.  -- Encouraged patient to quit smoking.

## 2022-05-24 NOTE — PROCEDURES
Multi-Ox Readings  Multi Ox #1 Room air   O2 sat % at rest 89   O2 sat % on exertion 87   O2 sat average on exertion     Multi Ox #2 2 LPM   O2 sat % at rest 95   O2 sat % on exertion 84   O2 sat average on exertion       Oxygen Use     Oxygen Frequency     Duration of need     Is the patient mobile within the home?     CPAP Use?     BIPAP Use?     Servo Titration         3LPM: At rest 95%            On Exertion : 87%    4LPM: At Rest: 98%             On exertion: 91%

## 2022-07-19 ENCOUNTER — HOME STUDY (OUTPATIENT)
Dept: SLEEP MEDICINE | Facility: MEDICAL CENTER | Age: 77
End: 2022-07-19
Payer: MEDICARE

## 2022-07-19 DIAGNOSIS — J96.11 CHRONIC RESPIRATORY FAILURE WITH HYPOXIA (HCC): ICD-10-CM

## 2022-07-19 PROCEDURE — 94762 N-INVAS EAR/PLS OXIMTRY CONT: CPT | Performed by: STUDENT IN AN ORGANIZED HEALTH CARE EDUCATION/TRAINING PROGRAM

## 2022-07-20 NOTE — PROCEDURES
Overnight Pulse Oximetry    Interpretation:    This overnight oximetry was recorded on 7/19/2022 with the patient on Room Air.  The analysis duration was 8hrs 48min. 72 total oximetric events occurred encompassing 51.6 minutes .  The average event duration was  43 seconds .  The saturations were less than 90% for 92.3% of the recording.  Basal oxygen saturation of 86.2%. The helena saturation was 76% .  The patient spent 419.7 minutes with saturations < 88%.  Overall, this continuous nocturnal oximetry demonstrates severe nocturnal hypoxia while on room air.    Recommendation:  Significant hypoxia seen during night of study. May benefit from oxygen supplement vs sleep study if sleep etiology is suspected.   Clinical correlation is needed.       [Patient] : patient

## 2022-07-21 DIAGNOSIS — G47.33 OSA (OBSTRUCTIVE SLEEP APNEA): ICD-10-CM

## 2022-07-21 DIAGNOSIS — G47.34 NOCTURNAL HYPOXIA: ICD-10-CM

## 2022-07-21 NOTE — PROGRESS NOTES
Called patient to discuss findings of her room air OPO.  Patient usually wears 2 LPM of oxygen at night.  Advised patient to continue to wear 2 LPM at night and to have a sleep study done.  She does have a STOP-BANG score of 5.

## 2022-09-01 ENCOUNTER — OFFICE VISIT (OUTPATIENT)
Dept: SLEEP MEDICINE | Facility: MEDICAL CENTER | Age: 77
End: 2022-09-01
Payer: MEDICARE

## 2022-09-01 VITALS
BODY MASS INDEX: 19.45 KG/M2 | DIASTOLIC BLOOD PRESSURE: 64 MMHG | SYSTOLIC BLOOD PRESSURE: 106 MMHG | OXYGEN SATURATION: 93 % | HEART RATE: 79 BPM | HEIGHT: 61 IN | WEIGHT: 103 LBS

## 2022-09-01 DIAGNOSIS — J96.11 CHRONIC RESPIRATORY FAILURE WITH HYPOXIA (HCC): ICD-10-CM

## 2022-09-01 DIAGNOSIS — J44.9 CHRONIC OBSTRUCTIVE PULMONARY DISEASE, UNSPECIFIED COPD TYPE (HCC): ICD-10-CM

## 2022-09-01 DIAGNOSIS — Z72.0 TOBACCO USE: ICD-10-CM

## 2022-09-01 PROCEDURE — 99214 OFFICE O/P EST MOD 30 MIN: CPT | Performed by: INTERNAL MEDICINE

## 2022-09-01 RX ORDER — PREDNISONE 10 MG/1
TABLET ORAL
Qty: 18 TABLET | Refills: 0 | Status: SHIPPED | OUTPATIENT
Start: 2022-09-01 | End: 2023-01-30

## 2022-09-01 RX ORDER — AZITHROMYCIN 250 MG/1
250 TABLET, FILM COATED ORAL
Qty: 36 TABLET | Refills: 3 | Status: SHIPPED | OUTPATIENT
Start: 2022-09-02 | End: 2023-06-07 | Stop reason: SDUPTHER

## 2022-09-01 RX ORDER — IPRATROPIUM BROMIDE AND ALBUTEROL SULFATE 2.5; .5 MG/3ML; MG/3ML
3 SOLUTION RESPIRATORY (INHALATION) EVERY 4 HOURS PRN
Qty: 120 ML | Refills: 11 | Status: SHIPPED | OUTPATIENT
Start: 2022-09-01 | End: 2022-09-06

## 2022-09-01 RX ORDER — AZITHROMYCIN 250 MG/1
250 TABLET, FILM COATED ORAL
Qty: 36 TABLET | Refills: 3 | Status: SHIPPED | OUTPATIENT
Start: 2022-09-02 | End: 2022-09-01

## 2022-09-01 RX ORDER — ALBUTEROL SULFATE 90 UG/1
1-2 AEROSOL, METERED RESPIRATORY (INHALATION) EVERY 4 HOURS PRN
Qty: 1 EACH | Refills: 11 | Status: SHIPPED | OUTPATIENT
Start: 2022-09-01 | End: 2023-06-07 | Stop reason: SDUPTHER

## 2022-09-01 RX ORDER — DOXYCYCLINE HYCLATE 100 MG/1
100 CAPSULE ORAL 2 TIMES DAILY
Qty: 14 CAPSULE | Refills: 0 | Status: SHIPPED | OUTPATIENT
Start: 2022-09-01 | End: 2023-01-30

## 2022-09-01 ASSESSMENT — ENCOUNTER SYMPTOMS
VOMITING: 0
ORTHOPNEA: 0
MYALGIAS: 0
SHORTNESS OF BREATH: 0
NAUSEA: 0
SPUTUM PRODUCTION: 1
DIARRHEA: 0
HEADACHES: 0
CLAUDICATION: 0
CONSTIPATION: 0
NECK PAIN: 0
FOCAL WEAKNESS: 0
SPEECH CHANGE: 0
EYE DISCHARGE: 0
SINUS PAIN: 0
EYE PAIN: 0
SORE THROAT: 0
STRIDOR: 0
BACK PAIN: 0
FEVER: 0
WEIGHT LOSS: 0
HEARTBURN: 0
EYE REDNESS: 0
HEMOPTYSIS: 0
FALLS: 0
BLURRED VISION: 0
DIZZINESS: 0
WEAKNESS: 0
DEPRESSION: 0
PALPITATIONS: 0
DIAPHORESIS: 0
ABDOMINAL PAIN: 0
PHOTOPHOBIA: 0
DOUBLE VISION: 0
TREMORS: 0
PND: 0
CHILLS: 0
COUGH: 1
WHEEZING: 0

## 2022-09-01 NOTE — PROGRESS NOTES
Chief Complaint   Patient presents with    COPD     Last seen 05/24/22         HPI: This patient is a 77 y.o. female whom is followed in our clinic for COPD c/b chronic hypoxic respiratory failure last seen by me on 1/17/22 and in the interim by NP, Ginna Mccauley, on 5/24/22.  She is a current tobacco smoker with greater than 50-pack-year history currently down to 3 cigarettes per day. Lung cancer screening CT chest from April of this year showed no concerning nodules.  Her current COPD regimen includes trelegy 200, TITUS nebulized with mucinex for airway clearance and prn MDI. She is on supplemental O2 between 2-4L depending on activity. PFTs from 10/28/21 showed FEV1/FVC ratio 46 and FEV1 of 0.99 L or 52% predicted, borderline hyperinflation with air trapping, RV of 162% predicted and reduced DLCO 66% predicted.   She is SOB with minimal activity with MMRC 3-4. At my last visit with the pt we ordered ECG with plans to start prophylactic abx with low dose azithromycin which was started at her last visit with Ginna. She has not needed prednisone since starting lose dose azithromycin M/W/F. She does have excessive mucous production sometimes with difficulty clearing her mucous despite nebulized bronchodilators and mucinex. An OPO on RA was ordered at her last visit that showed expected desaturation and pt was referred to sleep medicine. Pt has no s/s to suggest sleep apnea.    No past medical history on file.    Social History     Socioeconomic History    Marital status: Single     Spouse name: Not on file    Number of children: Not on file    Years of education: Not on file    Highest education level: Not on file   Occupational History    Not on file   Tobacco Use    Smoking status: Every Day     Packs/day: 0.25     Years: 55.00     Pack years: 13.75     Types: Cigarettes     Start date: 1965    Smokeless tobacco: Never    Tobacco comments:     05/24/22-3 cig. per day with a couple of puffs on each one   Vaping  Use    Vaping Use: Former    Substances: Nicotine    Passive vaping exposure: Yes   Substance and Sexual Activity    Alcohol use: Yes     Comment: rarely     Drug use: Never    Sexual activity: Not on file   Other Topics Concern    Not on file   Social History Narrative    Not on file     Social Determinants of Health     Financial Resource Strain: Not on file   Food Insecurity: Not on file   Transportation Needs: Not on file   Physical Activity: Not on file   Stress: Not on file   Social Connections: Not on file   Intimate Partner Violence: Not on file   Housing Stability: Not on file       No family history on file.    Current Outpatient Medications on File Prior to Visit   Medication Sig Dispense Refill    nicotine polacrilex (NICORETTE) 2 MG lozenge Place 1 Lozenge into mouth between cheek and gum every 3 hours as needed (when desiring to smoke). 168 Lozenge 3    guaiFENesin ER (MUCINEX) 600 MG TABLET SR 12 HR Take 600 mg by mouth every 12 hours.      atorvastatin (LIPITOR) 10 MG Tab Take 10 mg by mouth every evening.      Cholecalciferol (VITAMIN D3) 25 MCG Tab Take  by mouth every day.      Multiple Vitamins-Minerals (PRESERVISION AREDS 2 PO) Take  by mouth every day.      Ibuprofen (MOTRIN PO) Take 400 mg by mouth every day.      amLODIPine (NORVASC) 5 MG Tab Take 5 mg by mouth every day.      ibandronate (BONIVA) 150 MG tablet PLEASE SEE ATTACHED FOR DETAILED DIRECTIONS      liothyronine (CYTOMEL) 5 MCG Tab Take 10 mcg by mouth every day.      losartan (COZAAR) 100 MG Tab Take 100 mg by mouth every day.      triamterene-hctz (MAXZIDE-25/DYAZIDE) 37.5-25 MG Tab Take 1 Tablet by mouth every day.      ipratropium (ATROVENT) 0.02 % Solution Take 1 mL by nebulization every four hours as needed (SOB or cough). 120 mL 11     No current facility-administered medications on file prior to visit.       Patient has no known allergies.      ROS:   Review of Systems   Constitutional:  Negative for chills, diaphoresis,  "fever, malaise/fatigue and weight loss.   HENT:  Negative for congestion, ear discharge, ear pain, hearing loss, nosebleeds, sinus pain, sore throat and tinnitus.    Eyes:  Negative for blurred vision, double vision, photophobia, pain, discharge and redness.   Respiratory:  Positive for cough and sputum production. Negative for hemoptysis, shortness of breath, wheezing and stridor.    Cardiovascular:  Negative for chest pain, palpitations, orthopnea, claudication, leg swelling and PND.   Gastrointestinal:  Negative for abdominal pain, constipation, diarrhea, heartburn, nausea and vomiting.   Genitourinary:  Negative for dysuria and urgency.   Musculoskeletal:  Negative for back pain, falls, joint pain, myalgias and neck pain.   Skin:  Negative for itching and rash.   Neurological:  Negative for dizziness, tremors, speech change, focal weakness, weakness and headaches.   Endo/Heme/Allergies:  Negative for environmental allergies.   Psychiatric/Behavioral:  Negative for depression.      /64 (BP Location: Right arm, Patient Position: Sitting, BP Cuff Size: Adult)   Pulse 79   Ht 1.549 m (5' 1\")   Wt 46.7 kg (103 lb)   SpO2 93%   Physical Exam  Constitutional:       General: She is not in acute distress.     Appearance: Normal appearance. She is well-developed and normal weight.   HENT:      Head: Normocephalic and atraumatic.      Right Ear: External ear normal.      Left Ear: External ear normal.      Nose: Nose normal. No congestion.      Mouth/Throat:      Mouth: Mucous membranes are moist.      Pharynx: Oropharynx is clear. No oropharyngeal exudate.   Eyes:      General: No scleral icterus.     Extraocular Movements: Extraocular movements intact.      Conjunctiva/sclera: Conjunctivae normal.      Pupils: Pupils are equal, round, and reactive to light.   Neck:      Vascular: No JVD.      Trachea: No tracheal deviation.   Cardiovascular:      Rate and Rhythm: Normal rate and regular rhythm.      Heart " sounds: Normal heart sounds. No murmur heard.    No friction rub. No gallop.   Pulmonary:      Effort: Pulmonary effort is normal. No accessory muscle usage or respiratory distress.      Breath sounds: Wheezing present. No rales.      Comments: Few inspiratory b/l LL wheezes  Abdominal:      General: There is no distension.      Palpations: Abdomen is soft.      Tenderness: There is no abdominal tenderness.   Musculoskeletal:         General: No tenderness or deformity. Normal range of motion.      Cervical back: Normal range of motion and neck supple.      Right lower leg: No edema.      Left lower leg: No edema.   Lymphadenopathy:      Cervical: No cervical adenopathy.   Skin:     General: Skin is warm and dry.      Findings: No rash.      Nails: There is no clubbing.   Neurological:      Mental Status: She is alert and oriented to person, place, and time.      Cranial Nerves: No cranial nerve deficit.      Gait: Gait normal.   Psychiatric:         Behavior: Behavior normal.       PFTs as reviewed by me personally: as per hPI    Imaging as reviewed by me personally:  as per hPI    Assessment:  1. Chronic respiratory failure with hypoxia (HCC)  Overnight Oximetry      2. Chronic obstructive pulmonary disease, unspecified COPD type (HCC)  CBC WITH DIFFERENTIAL    IGE SERUM    DME Other    Fluticasone-Umeclidin-Vilant (TRELEGY ELLIPTA) 200-62.5-25 MCG/INH AEROSOL POWDER, BREATH ACTIVATED    ipratropium-albuterol (DUONEB) 0.5-2.5 (3) MG/3ML nebulizer solution    predniSONE (DELTASONE) 10 MG Tab    doxycycline (VIBRAMYCIN) 100 MG Cap    PULMONARY FUNCTION TESTS -Test requested: Complete Pulmonary Function Test    azithromycin (ZITHROMAX) 250 MG Tab      3. Tobacco use            Plan:  Chronic and 2/2 COPD. I have low susp for IRIS therefore we will repeat OPO on 3LPM and cancel sleep appt if SpO2 is adequate on this. Continue 2LPM at rest and up to 4LPM with activity which pt is compliant with and benefiting  from.  Chronic, severe based on recurrent exacerbations. Has improved with suppressive abx. I would like to check for eosinophilia and IgE in case there is an asthmatic component. Emergency prednisone and abx provided with instructions to stop azithromycin in lieu of doxycycline in the event of acute exacerbation. We will order flutter valve to assist in airway clearance and continue trelegy, nebulized bronchodialtors and TITUS. Update PFTs at f/u; counseled on tobacco cessation  Counseled on tobacco cessation; encouraged her efforts to decrease ; participating in lung Ca screening  Return in about 6 months (around 3/1/2023) for OPO on 3LPM, LABS, PFTs at time of f/u.

## 2022-09-04 ENCOUNTER — PATIENT MESSAGE (OUTPATIENT)
Dept: SLEEP MEDICINE | Facility: MEDICAL CENTER | Age: 77
End: 2022-09-04
Payer: MEDICARE

## 2022-09-04 DIAGNOSIS — J44.9 CHRONIC OBSTRUCTIVE PULMONARY DISEASE, UNSPECIFIED COPD TYPE (HCC): ICD-10-CM

## 2022-09-06 RX ORDER — IPRATROPIUM BROMIDE AND ALBUTEROL SULFATE 2.5; .5 MG/3ML; MG/3ML
3 SOLUTION RESPIRATORY (INHALATION) EVERY 4 HOURS PRN
Qty: 120 ML | Refills: 11 | Status: SHIPPED | OUTPATIENT
Start: 2022-09-06 | End: 2023-06-07 | Stop reason: SDUPTHER

## 2022-10-26 ENCOUNTER — HOME STUDY (OUTPATIENT)
Dept: SLEEP MEDICINE | Facility: MEDICAL CENTER | Age: 77
End: 2022-10-26
Attending: INTERNAL MEDICINE
Payer: MEDICARE

## 2022-10-26 DIAGNOSIS — J96.11 CHRONIC RESPIRATORY FAILURE WITH HYPOXIA (HCC): ICD-10-CM

## 2022-10-26 PROCEDURE — 94762 N-INVAS EAR/PLS OXIMTRY CONT: CPT | Performed by: INTERNAL MEDICINE

## 2022-11-07 NOTE — PROCEDURES
This is an overnight oximetry study performed on October 26, 2022 for duration of 8 hours and 22 minutes on 3 L of supplemental oxygen.  Basal SPO2 is 95%.  There are 3 episodes where the patient does drop below 90% although these are fairly discrete and may be loss of signal.  Overall oxygenation appears to be adequate on 3 L/min.

## 2022-11-09 ENCOUNTER — PATIENT MESSAGE (OUTPATIENT)
Dept: HEALTH INFORMATION MANAGEMENT | Facility: OTHER | Age: 77
End: 2022-11-09

## 2023-01-27 DIAGNOSIS — J44.9 CHRONIC OBSTRUCTIVE PULMONARY DISEASE, UNSPECIFIED COPD TYPE (HCC): ICD-10-CM

## 2023-01-30 RX ORDER — PREDNISONE 10 MG/1
TABLET ORAL
Qty: 18 TABLET | Refills: 0 | Status: SHIPPED | OUTPATIENT
Start: 2023-01-30 | End: 2023-06-07 | Stop reason: SDUPTHER

## 2023-01-30 RX ORDER — DOXYCYCLINE HYCLATE 100 MG/1
100 CAPSULE ORAL 2 TIMES DAILY
Qty: 14 CAPSULE | Refills: 0 | Status: SHIPPED | OUTPATIENT
Start: 2023-01-30 | End: 2023-02-06

## 2023-01-30 NOTE — TELEPHONE ENCOUNTER
Caller Name: Rose Oshea                 Call Back Number: [phone] (home)         Patient approves a detailed voicemail message: N\A    Have we ever prescribed this med? Yes.  If yes, what date? 9/2/22    Last OV: 9/1/22 Dr. Mckeon     Next OV: 3/1/23 Dr. Mckeon     DX: COPD     Medications:  Current Outpatient Medications   Medication Sig Dispense Refill    ipratropium-albuterol (DUONEB) 0.5-2.5 (3) MG/3ML nebulizer solution Take 3 mL by nebulization every four hours as needed for Shortness of Breath. 120 mL 11    Fluticasone-Umeclidin-Vilant (TRELEGY ELLIPTA) 200-62.5-25 MCG/INH AEROSOL POWDER, BREATH ACTIVATED Inhale 1 Puff every day. 3 Each 3    albuterol 108 (90 Base) MCG/ACT Aero Soln inhalation aerosol Inhale 1-2 Puffs every four hours as needed for Shortness of Breath. 1 Each 11    predniSONE (DELTASONE) 10 MG Tab Take 30mg x 3 days, then take 20mg x 3 days, then take 10mg x 3 days, with food, then discontinue. 18 Tablet 0    azithromycin (ZITHROMAX) 250 MG Tab Take 1 Tablet by mouth every Monday, Wednesday, and Friday. 36 Tablet 3    ipratropium (ATROVENT) 0.02 % Solution Take 1 mL by nebulization every four hours as needed (SOB or cough). 120 mL 11    nicotine polacrilex (NICORETTE) 2 MG lozenge Place 1 Lozenge into mouth between cheek and gum every 3 hours as needed (when desiring to smoke). 168 Lozenge 3    guaiFENesin ER (MUCINEX) 600 MG TABLET SR 12 HR Take 600 mg by mouth every 12 hours.      atorvastatin (LIPITOR) 10 MG Tab Take 10 mg by mouth every evening.      Cholecalciferol (VITAMIN D3) 25 MCG Tab Take  by mouth every day.      Multiple Vitamins-Minerals (PRESERVISION AREDS 2 PO) Take  by mouth every day.      Ibuprofen (MOTRIN PO) Take 400 mg by mouth every day.      amLODIPine (NORVASC) 5 MG Tab Take 5 mg by mouth every day.      ibandronate (BONIVA) 150 MG tablet PLEASE SEE ATTACHED FOR DETAILED DIRECTIONS      liothyronine (CYTOMEL) 5 MCG Tab Take 10 mcg by mouth every day.       losartan (COZAAR) 100 MG Tab Take 100 mg by mouth every day.      triamterene-hctz (MAXZIDE-25/DYAZIDE) 37.5-25 MG Tab Take 1 Tablet by mouth every day.       No current facility-administered medications for this visit.

## 2023-03-15 ASSESSMENT — ENCOUNTER SYMPTOMS
MYALGIAS: 0
WHEEZING: 1
FALLS: 0
NAUSEA: 0
SPUTUM PRODUCTION: 1
HEARTBURN: 0
WEAKNESS: 0
FEVER: 0
COUGH: 1
DIAPHORESIS: 0
CHILLS: 0
SINUS PAIN: 0
SHORTNESS OF BREATH: 1
PALPITATIONS: 0
DIZZINESS: 0
HEMOPTYSIS: 0
DIARRHEA: 0
VOMITING: 0
HEADACHES: 0

## 2023-03-15 NOTE — PROGRESS NOTES
"Pulmonary Clinic Note    Date of Visit: 3/23/2023     Chief Complaint:  No chief complaint on file.    HPI:   Rose Oshea is a very pleasant 77 y.o. year old female current smoker (50+ pack-years), with a PMHx of COPD  who presented to the Pulmonary Clinic for a regular follow up. Last seen in the office on 5/24/2022 with myself.    Patient is followed by office for emphysema.  PFTs in 2021 show an FVC of 2.17 L or 88%, FEV1 0.99 L or 52%, FEV1/FVC 46%, %, %, DLCO 66% predicted.        Pulmonary history per Dr. Mckeon's HPI:  \"Lung cancer screening CT chest from March 15 of this year showed emphysematous changes with bibasilar atelectasis but no concerning nodules or lymphadenopathy.  She had been started on Anoro and was on this for some time prior to referral to us which we have since escalated to Trelegy 200 and prn TITUS via MDI and nebulizer. She has been tx for acute exacerbation 3 times in the last 6 mos and is wheezing on exam today. She has borderline desaturation on 6 MWT to 89% helena and PFTs from 10/28/21 showed FEV1/FVC ratio 46 and FEV1 of 0.99 L or 52% predicted, borderline hyperinflation with air trapping, RV of 162% predicted and reduced DLCO 66% predicted.\"    Today she presents with her friend.  She states that she has been more short of breath, even while doing ADLs.  She has a persistent cough which intermittently produces white sputum.  She does have wheezing.  She does monitor her oxygen saturations at home and notices it to be in the 80s on rest and in the 70s upon exertion.  Multi ox in the office today shows need for 4 L on exertion.  She does report that her hands are cold at times.  She still using Trelegy.  She is using her albuterol 3-4 times a day and her nebulizer 5 times a day.  She states she has had 2 COPD exacerbations since January.  She is still currently smoking but has been able to cut down to 3 cigarettes a day.    Exacerbations since January: 2***    Current " medication use: Trelegy, albuterol nebulizer and inhaler***    Oxygen use: 4 LPM on exertion    MMRC Grade:   0- Breathless only during strenuous exercise  1- Short of breath when hurrying or going up a small hill  2- Walks slower than friends due to breathlessness, has to stop at own pace  3- Stops to catch breath on level ground after 100m  4- Breathless with ambulating around house or ADLs        No past medical history on file.  No past surgical history on file.  Social History     Socioeconomic History    Marital status: Single     Spouse name: Not on file    Number of children: Not on file    Years of education: Not on file    Highest education level: Not on file   Occupational History    Not on file   Tobacco Use    Smoking status: Every Day     Packs/day: 0.25     Years: 55.00     Pack years: 13.75     Types: Cigarettes     Start date: 1965    Smokeless tobacco: Never    Tobacco comments:     05/24/22-3 cig. per day with a couple of puffs on each one   Vaping Use    Vaping Use: Former    Substances: Nicotine    Passive vaping exposure: Yes   Substance and Sexual Activity    Alcohol use: Yes     Comment: rarely     Drug use: Never    Sexual activity: Not on file   Other Topics Concern    Not on file   Social History Narrative    Not on file     Social Determinants of Health     Financial Resource Strain: Not on file   Food Insecurity: Not on file   Transportation Needs: Not on file   Physical Activity: Not on file   Stress: Not on file   Social Connections: Not on file   Intimate Partner Violence: Not on file   Housing Stability: Not on file        No family history on file.  Current Outpatient Medications on File Prior to Visit   Medication Sig Dispense Refill    predniSONE (DELTASONE) 10 MG Tab TAKE 3 TABLET BY MOUTH FOR3 DAYS, THEN TAKE 2 TABS X 3 DAYS, THEN TAKE 1 TABX 3 DAYS, WITH FOOD, THEN DISCONTINUE.= 18 Tablet 0    ipratropium-albuterol (DUONEB) 0.5-2.5 (3) MG/3ML nebulizer solution Take 3 mL by  nebulization every four hours as needed for Shortness of Breath. 120 mL 11    Fluticasone-Umeclidin-Vilant (TRELEGY ELLIPTA) 200-62.5-25 MCG/INH AEROSOL POWDER, BREATH ACTIVATED Inhale 1 Puff every day. 3 Each 3    albuterol 108 (90 Base) MCG/ACT Aero Soln inhalation aerosol Inhale 1-2 Puffs every four hours as needed for Shortness of Breath. 1 Each 11    azithromycin (ZITHROMAX) 250 MG Tab Take 1 Tablet by mouth every Monday, Wednesday, and Friday. 36 Tablet 3    ipratropium (ATROVENT) 0.02 % Solution Take 1 mL by nebulization every four hours as needed (SOB or cough). 120 mL 11    nicotine polacrilex (NICORETTE) 2 MG lozenge Place 1 Lozenge into mouth between cheek and gum every 3 hours as needed (when desiring to smoke). 168 Lozenge 3    guaiFENesin ER (MUCINEX) 600 MG TABLET SR 12 HR Take 600 mg by mouth every 12 hours.      atorvastatin (LIPITOR) 10 MG Tab Take 10 mg by mouth every evening.      Cholecalciferol (VITAMIN D3) 25 MCG Tab Take  by mouth every day.      Multiple Vitamins-Minerals (PRESERVISION AREDS 2 PO) Take  by mouth every day.      Ibuprofen (MOTRIN PO) Take 400 mg by mouth every day.      amLODIPine (NORVASC) 5 MG Tab Take 5 mg by mouth every day.      ibandronate (BONIVA) 150 MG tablet PLEASE SEE ATTACHED FOR DETAILED DIRECTIONS      liothyronine (CYTOMEL) 5 MCG Tab Take 10 mcg by mouth every day.      losartan (COZAAR) 100 MG Tab Take 100 mg by mouth every day.      triamterene-hctz (MAXZIDE-25/DYAZIDE) 37.5-25 MG Tab Take 1 Tablet by mouth every day.       No current facility-administered medications on file prior to visit.     Allergies: Patient has no known allergies.    ROS:   Review of Systems   Constitutional:  Negative for chills, diaphoresis, fever and malaise/fatigue.   HENT:  Negative for congestion and sinus pain.    Respiratory:  Positive for cough, sputum production (small, white), shortness of breath and wheezing. Negative for hemoptysis.    Cardiovascular:  Negative for chest  pain, palpitations and leg swelling.   Gastrointestinal:  Negative for diarrhea, heartburn, nausea and vomiting.   Musculoskeletal:  Negative for falls and myalgias.   Neurological:  Negative for dizziness, weakness and headaches.     Vitals:  There were no vitals taken for this visit.    Physical Exam:  Physical Exam  Constitutional:       General: She is not in acute distress.     Appearance: Normal appearance. She is not ill-appearing, toxic-appearing or diaphoretic.   Cardiovascular:      Rate and Rhythm: Normal rate and regular rhythm.      Heart sounds: No murmur heard.    No friction rub. No gallop.   Pulmonary:      Effort: No respiratory distress.      Breath sounds: No stridor. Wheezing present. No rhonchi or rales.   Musculoskeletal:         General: No swelling.      Right lower leg: No edema.      Left lower leg: No edema.   Skin:     General: Skin is warm.   Neurological:      General: No focal deficit present.      Mental Status: She is alert and oriented to person, place, and time.   Psychiatric:         Mood and Affect: Mood normal.         Behavior: Behavior normal.         Thought Content: Thought content normal.         Judgment: Judgment normal.       Laboratory Data:  Multi ox (Date: 5/24/2022)-        PFTs (Date: 10/8/2021)-    Impression:  This study demonstrates the presence of moderate to severe obstructive lung disease.     CT Chest: (Date: 4/1/2022)-  Impression:  1. No new or suspicious pulmonary nodule.  2.  Calcified granulomas and calcified right hilar, mediastinal lymph nodes   Lungs:  Bilateral lungs are clear. No airspace opacity. Right upper lobe  calcified granulomas.  Emphysema: Moderate.  Fibrosis: None.       ECG: (Date: 5/23/2022)-      Assessment and Plan:    Problem List Items Addressed This Visit    None  Diagnostic studies have been reviewed with the patient.    No follow-ups on file.     This note was generated using voice recognition software which has a chance of  producing errors of grammar and possibly content.  I have made every reasonable attempt to find and correct any obvious errors, but it should be expected that some may not be found prior to finalization of this note.    Time spent in record review prior to patient arrival, reviewing results, and in face-to-face encounter totaled 39  min.  __________  BRIGITTE Arciniega  Pulmonary Medicine  [facility]

## 2023-03-23 ENCOUNTER — APPOINTMENT (OUTPATIENT)
Dept: SLEEP MEDICINE | Facility: MEDICAL CENTER | Age: 78
End: 2023-03-23
Payer: MEDICARE

## 2023-03-23 ENCOUNTER — APPOINTMENT (OUTPATIENT)
Dept: PULMONOLOGY | Facility: MEDICAL CENTER | Age: 78
End: 2023-03-23
Attending: INTERNAL MEDICINE
Payer: MEDICARE

## 2023-03-29 ENCOUNTER — APPOINTMENT (OUTPATIENT)
Dept: SLEEP MEDICINE | Facility: MEDICAL CENTER | Age: 78
End: 2023-03-29
Attending: INTERNAL MEDICINE
Payer: MEDICARE

## 2023-04-20 ENCOUNTER — APPOINTMENT (OUTPATIENT)
Dept: SLEEP MEDICINE | Facility: MEDICAL CENTER | Age: 78
End: 2023-04-20
Attending: INTERNAL MEDICINE
Payer: MEDICARE

## 2023-04-25 ENCOUNTER — APPOINTMENT (OUTPATIENT)
Dept: SLEEP MEDICINE | Facility: MEDICAL CENTER | Age: 78
End: 2023-04-25
Attending: NURSE PRACTITIONER
Payer: MEDICARE

## 2023-06-07 ENCOUNTER — OFFICE VISIT (OUTPATIENT)
Dept: SLEEP MEDICINE | Facility: MEDICAL CENTER | Age: 78
End: 2023-06-07
Attending: NURSE PRACTITIONER
Payer: MEDICARE

## 2023-06-07 VITALS
HEIGHT: 61 IN | DIASTOLIC BLOOD PRESSURE: 60 MMHG | HEART RATE: 84 BPM | RESPIRATION RATE: 16 BRPM | OXYGEN SATURATION: 92 % | BODY MASS INDEX: 20.52 KG/M2 | SYSTOLIC BLOOD PRESSURE: 108 MMHG | WEIGHT: 108.7 LBS

## 2023-06-07 DIAGNOSIS — J96.11 CHRONIC RESPIRATORY FAILURE WITH HYPOXIA (HCC): ICD-10-CM

## 2023-06-07 DIAGNOSIS — J44.9 CHRONIC OBSTRUCTIVE PULMONARY DISEASE, UNSPECIFIED COPD TYPE (HCC): ICD-10-CM

## 2023-06-07 PROCEDURE — 3074F SYST BP LT 130 MM HG: CPT | Performed by: NURSE PRACTITIONER

## 2023-06-07 PROCEDURE — 3078F DIAST BP <80 MM HG: CPT | Performed by: NURSE PRACTITIONER

## 2023-06-07 PROCEDURE — 99212 OFFICE O/P EST SF 10 MIN: CPT | Performed by: NURSE PRACTITIONER

## 2023-06-07 PROCEDURE — 99214 OFFICE O/P EST MOD 30 MIN: CPT | Performed by: NURSE PRACTITIONER

## 2023-06-07 RX ORDER — IPRATROPIUM BROMIDE AND ALBUTEROL SULFATE 2.5; .5 MG/3ML; MG/3ML
3 SOLUTION RESPIRATORY (INHALATION) EVERY 4 HOURS PRN
Qty: 120 ML | Refills: 11 | Status: SHIPPED | OUTPATIENT
Start: 2023-06-07 | End: 2023-08-29

## 2023-06-07 RX ORDER — AZITHROMYCIN 250 MG/1
250 TABLET, FILM COATED ORAL
Qty: 36 TABLET | Refills: 3 | Status: SHIPPED | OUTPATIENT
Start: 2023-06-07

## 2023-06-07 RX ORDER — FLUTICASONE FUROATE, UMECLIDINIUM BROMIDE AND VILANTEROL TRIFENATATE 200; 62.5; 25 UG/1; UG/1; UG/1
1 POWDER RESPIRATORY (INHALATION) DAILY
Qty: 1 EACH | Refills: 5 | Status: SHIPPED | OUTPATIENT
Start: 2023-06-07 | End: 2023-11-13

## 2023-06-07 RX ORDER — PREDNISONE 10 MG/1
TABLET ORAL
Qty: 18 TABLET | Refills: 0 | Status: SHIPPED | OUTPATIENT
Start: 2023-06-07 | End: 2023-12-07

## 2023-06-07 RX ORDER — ALBUTEROL SULFATE 90 UG/1
1-2 AEROSOL, METERED RESPIRATORY (INHALATION) EVERY 4 HOURS PRN
Qty: 1 EACH | Refills: 11 | Status: SHIPPED | OUTPATIENT
Start: 2023-06-07

## 2023-06-07 ASSESSMENT — PATIENT HEALTH QUESTIONNAIRE - PHQ9: CLINICAL INTERPRETATION OF PHQ2 SCORE: 0

## 2023-06-07 NOTE — PROGRESS NOTES
"Chief Complaint   Patient presents with    COPD     OPO results       HPI:  Rose Oshea is a 78 y.o. year old female here today for follow-up on COPD, chronic respiratory failure with hypoxia and OPO results.  Last seen 9/1/2022 by Dr. Mcekon. She is a current tobacco smoker with greater than 50-pack-year history currently down to 3 cigarettes per day.  Current regimen includes Trelegy 200, albuterol nebulized and Mucinex.  She uses supplemental oxygen between 2 to 4 L/min depending on activity.  Is also currently on low-dose azithromycin on Monday Wednesday Friday.    Patient denies any new or worsening dyspnea or shortness of breath.  Last emergency pack use was on April 15, 2023.  She continues to smoke 3 cigarettes daily.  She does note cough with clear sputum and occasional chest congestion which is baseline for her.  She denies any wheezing, chest tightness, or new or worsening dyspnea or shortness of breath.  mMRC is 3-4.  She continues to take azithromycin Monday Wednesday Friday.  Denies any exacerbations since last visit.    O. P.O. on 3 L/min (10/26/2022):  This is an overnight oximetry study performed on October 26, 2022 for duration of 8 hours and 22 minutes on 3 L of supplemental oxygen.  Basal SPO2 is 95%.  There are 3 episodes where the patient does drop below 90% although these are fairly discrete and may be loss of signal.  Overall oxygenation appears to be adequate on 3 L/min.    ROS: As per HPI and otherwise negative if not stated.    History reviewed. No pertinent past medical history.    History reviewed. No pertinent surgical history.    History reviewed. No pertinent family history.    Allergies as of 06/07/2023    (No Known Allergies)        Vitals:  /60 (BP Location: Left arm, Patient Position: Sitting, BP Cuff Size: Adult)   Pulse 84   Resp 16   Ht 1.549 m (5' 1\")   Wt 49.3 kg (108 lb 11.2 oz)   SpO2 92%     Current medications as of today   Current Outpatient Medications "   Medication Sig Dispense Refill    predniSONE (DELTASONE) 10 MG Tab TAKE 3 TABLET BY MOUTH FOR3 DAYS, THEN TAKE 2 TABS X 3 DAYS, THEN TAKE 1 TABX 3 DAYS, WITH FOOD, THEN DISCONTINUE.= 18 Tablet 0    ipratropium-albuterol (DUONEB) 0.5-2.5 (3) MG/3ML nebulizer solution Take 3 mL by nebulization every four hours as needed for Shortness of Breath. 120 mL 11    azithromycin (ZITHROMAX) 250 MG Tab Take 1 Tablet by mouth every Monday, Wednesday, and Friday. 36 Tablet 3    albuterol 108 (90 Base) MCG/ACT Aero Soln inhalation aerosol Inhale 1-2 Puffs every four hours as needed for Shortness of Breath. 1 Each 11    Fluticasone-Umeclidin-Vilant (TRELEGY ELLIPTA) 200-62.5-25 MCG/ACT AEROSOL POWDER, BREATH ACTIVATED Inhale 1 Inhalation every day. 1 Each 5    Fluticasone-Umeclidin-Vilant (TRELEGY ELLIPTA) 200-62.5-25 MCG/INH AEROSOL POWDER, BREATH ACTIVATED Inhale 1 Puff every day. 3 Each 3    guaiFENesin ER (MUCINEX) 600 MG TABLET SR 12 HR Take 600 mg by mouth every 12 hours.      atorvastatin (LIPITOR) 10 MG Tab Take 10 mg by mouth every evening.      Cholecalciferol (VITAMIN D3) 25 MCG Tab Take  by mouth every day.      Ibuprofen (MOTRIN PO) Take 400 mg by mouth every day.      amLODIPine (NORVASC) 5 MG Tab Take 5 mg by mouth every day.      ibandronate (BONIVA) 150 MG tablet PLEASE SEE ATTACHED FOR DETAILED DIRECTIONS      liothyronine (CYTOMEL) 5 MCG Tab Take 10 mcg by mouth every day.      losartan (COZAAR) 100 MG Tab Take 100 mg by mouth every day.      triamterene-hctz (MAXZIDE-25/DYAZIDE) 37.5-25 MG Tab Take 1 Tablet by mouth every day.      ipratropium (ATROVENT) 0.02 % Solution Take 1 mL by nebulization every four hours as needed (SOB or cough). 120 mL 11    nicotine polacrilex (NICORETTE) 2 MG lozenge Place 1 Lozenge into mouth between cheek and gum every 3 hours as needed (when desiring to smoke). 168 Lozenge 3    Multiple Vitamins-Minerals (PRESERVISION AREDS 2 PO) Take  by mouth every day.       No current  facility-administered medications for this visit.         Physical Exam:   Gen:           Alert and oriented, No apparent distress. Mood and affect appropriate, normal interaction with examiner.  Eyes:          PERRL, EOM intact, sclere white, conjunctive moist.  Ears:          Not examined.   Hearing:     Grossly intact.  Nose:          Normal, no lesions or deformities.  Dentition:    Good dentition.  Oropharynx:   Tongue normal, posterior pharynx without erythema or exudate.  Neck:        Supple, trachea midline, no masses.  Respiratory Effort: No intercostal retractions or use of accessory muscles.   Lung Auscultation:      Clear to auscultation bilaterally; no rales, rhonchi or wheezing.  CV:            Regular rate and rhythm. No murmurs, rubs or gallops.  Abd:           Not examined.   Lymphadenopathy: Not examined.  Gait and Station: Normal.  Digits and Nails: No clubbing, cyanosis, petechiae, or nodes.   Cranial Nerves: II-XII grossly intact.  Skin:        No rashes, lesions or ulcers noted.               Ext:           No cyanosis or edema.      Assessment:  1. Chronic obstructive pulmonary disease, unspecified COPD type (HCC)  predniSONE (DELTASONE) 10 MG Tab    ipratropium-albuterol (DUONEB) 0.5-2.5 (3) MG/3ML nebulizer solution    azithromycin (ZITHROMAX) 250 MG Tab    albuterol 108 (90 Base) MCG/ACT Aero Soln inhalation aerosol    Fluticasone-Umeclidin-Vilant (TRELEGY ELLIPTA) 200-62.5-25 MCG/ACT AEROSOL POWDER, BREATH ACTIVATED      2. Chronic respiratory failure with hypoxia ([facility])  albuterol 108 (90 Base) MCG/ACT Aero Soln inhalation aerosol    Fluticasone-Umeclidin-Vilant (TRELEGY ELLIPTA) 200-62.5-25 MCG/ACT AEROSOL POWDER, BREATH ACTIVATED      3. IRIS (obstructive sleep apnea)            Plan:  Continue current regimen of Trelegy 200, low-dose azithromycin-Monday Wednesday Friday, DuoNebs every 4 hours as needed, albuterol as needed.  Also takes Mucinex for airway clearance.  Patient did not  complete PFTs that were ordered at last visit.  We will reschedule.  Patient denies any new or worsening dyspnea or shortness of breath.  Patient will follow-up in 6 months, but can be seen sooner if needed.  Stable at 3 to 4 L/min.    Please note that this dictation was created using voice recognition software. I have made every reasonable attempt to correct obvious errors, but it is possible there are errors of grammar and possibly content that I did not discover before finalizing the note.

## 2023-06-28 ENCOUNTER — NON-PROVIDER VISIT (OUTPATIENT)
Dept: SLEEP MEDICINE | Facility: MEDICAL CENTER | Age: 78
End: 2023-06-28
Attending: INTERNAL MEDICINE
Payer: MEDICARE

## 2023-06-28 VITALS — BODY MASS INDEX: 20.2 KG/M2 | WEIGHT: 107 LBS | HEIGHT: 61 IN

## 2023-06-28 DIAGNOSIS — J44.9 CHRONIC OBSTRUCTIVE PULMONARY DISEASE, UNSPECIFIED COPD TYPE (HCC): ICD-10-CM

## 2023-06-28 PROCEDURE — 94726 PLETHYSMOGRAPHY LUNG VOLUMES: CPT | Performed by: INTERNAL MEDICINE

## 2023-06-28 PROCEDURE — 94729 DIFFUSING CAPACITY: CPT | Mod: 26 | Performed by: INTERNAL MEDICINE

## 2023-06-28 PROCEDURE — 94726 PLETHYSMOGRAPHY LUNG VOLUMES: CPT | Mod: 26 | Performed by: INTERNAL MEDICINE

## 2023-06-28 PROCEDURE — 94060 EVALUATION OF WHEEZING: CPT | Performed by: INTERNAL MEDICINE

## 2023-06-28 PROCEDURE — 94060 EVALUATION OF WHEEZING: CPT | Mod: 26 | Performed by: INTERNAL MEDICINE

## 2023-06-28 PROCEDURE — 94729 DIFFUSING CAPACITY: CPT | Performed by: INTERNAL MEDICINE

## 2023-06-28 ASSESSMENT — PULMONARY FUNCTION TESTS
FEV1: 0.69
FVC: 1.64
FEV1_PERCENT_PREDICTED: 37
FVC_PREDICTED: 2.38
FEV1/FVC: 41
FEV1/FVC: 42
FEV1: 0.68
FEV1_PERCENT_PREDICTED: 37
FEV1_PERCENT_CHANGE: 0
FEV1/FVC_PREDICTED: 78
FEV1/FVC_PERCENT_LLN: 65
FEV1_LLN: 1.53
FEV1/FVC_PERCENT_CHANGE: -1
FEV1/FVC_PERCENT_PREDICTED: 54
FVC: 1.66
FEV1/FVC_PERCENT_PREDICTED: 54
FVC_PERCENT_PREDICTED: 68
FEV1_PERCENT_CHANGE: 0
FEV1/FVC_PERCENT_PREDICTED: 77
FVC_PERCENT_PREDICTED: 69
FEV1/FVC: 40.96
FEV1/FVC: 42
FEV1/FVC_PERCENT_PREDICTED: 52
FVC_LLN: 1.99
FEV1_PREDICTED: 1.84
FEV1/FVC_PERCENT_PREDICTED: 53

## 2023-06-28 NOTE — PROCEDURES
Tech: Adwoa Gentile, RT  Good patient effort & cooperation.  Test was performed on the [employer] Body Plethysmograph- Elite DX system.  The predicted sets used for Spirometry are GLI-2012, for Lung Volumes are ITS, and for DLCO is GLI 2017.  The results of this test meet the ATS standards for acceptability and repeatability.  The DLCO was uncorrected for Hb.  A bronchodilator of Ventolin HFA 2 puffs via spacer was administered.  DLCO was performed during dilation period.    Interpretation:   Baseline spirometry shows airflow obstruction with an FEV1/FVC ratio 42 and an FEV1 of 0.69 L or 37% predicted.  No significant bronchodilator response.  Total lung capacity is high normal at 5.46 L or 118% predicted.  There is significant air trapping with residual volume of 169% predicted.  Diffusion capacity is reduced at 33% predicted.  Pulmonary function testing shows airflow obstruction with air trapping and reduced DLCO consistent with advanced obstructive pulmonary disease.

## 2023-08-28 DIAGNOSIS — J44.9 CHRONIC OBSTRUCTIVE PULMONARY DISEASE, UNSPECIFIED COPD TYPE (HCC): ICD-10-CM

## 2023-08-28 NOTE — TELEPHONE ENCOUNTER
Name from pharmacy: IPRAT-ALBUT 0.5-3(2.5) MG/3 ML          Will file in chart as: ipratropium-albuterol (DUONEB) 0.5-2.5 (3) MG/3ML nebulizer solution    Sig: Take 3 mL by nebulization every four hours as needed for Shortness of Breath.    Disp:  450 mL    Refills:  11    Start: 8/28/2023    Class: Normal    Non-formulary For: Chronic obstructive pulmonary disease, unspecified COPD type (HCC)    Last ordered: 2 months ago (6/7/2023) by HARSHAD Simpson    Rx #: [id number]    Pharmacy comment: Product Backordered/Unavailable.

## 2023-08-29 RX ORDER — IPRATROPIUM BROMIDE AND ALBUTEROL SULFATE 2.5; .5 MG/3ML; MG/3ML
3 SOLUTION RESPIRATORY (INHALATION) EVERY 4 HOURS PRN
Qty: 450 ML | Refills: 11 | Status: SHIPPED | OUTPATIENT
Start: 2023-08-29

## 2023-11-13 DIAGNOSIS — J44.9 CHRONIC OBSTRUCTIVE PULMONARY DISEASE, UNSPECIFIED COPD TYPE (HCC): ICD-10-CM

## 2023-11-13 DIAGNOSIS — J96.11 CHRONIC RESPIRATORY FAILURE WITH HYPOXIA (HCC): ICD-10-CM

## 2023-11-13 RX ORDER — FLUTICASONE FUROATE, UMECLIDINIUM BROMIDE AND VILANTEROL TRIFENATATE 200; 62.5; 25 UG/1; UG/1; UG/1
POWDER RESPIRATORY (INHALATION)
Qty: 180 EACH | Refills: 1 | Status: SHIPPED | OUTPATIENT
Start: 2023-11-13

## 2023-11-29 ENCOUNTER — PATIENT MESSAGE (OUTPATIENT)
Dept: HEALTH INFORMATION MANAGEMENT | Facility: OTHER | Age: 78
End: 2023-11-29

## 2023-12-07 ENCOUNTER — OFFICE VISIT (OUTPATIENT)
Dept: SLEEP MEDICINE | Facility: MEDICAL CENTER | Age: 78
End: 2023-12-07
Attending: NURSE PRACTITIONER
Payer: MEDICARE

## 2023-12-07 VITALS
RESPIRATION RATE: 12 BRPM | HEIGHT: 61 IN | BODY MASS INDEX: 18.94 KG/M2 | OXYGEN SATURATION: 97 % | WEIGHT: 100.3 LBS | HEART RATE: 85 BPM | DIASTOLIC BLOOD PRESSURE: 62 MMHG | SYSTOLIC BLOOD PRESSURE: 142 MMHG

## 2023-12-07 DIAGNOSIS — J96.11 CHRONIC RESPIRATORY FAILURE WITH HYPOXIA (HCC): ICD-10-CM

## 2023-12-07 DIAGNOSIS — J44.9 CHRONIC OBSTRUCTIVE PULMONARY DISEASE, UNSPECIFIED COPD TYPE (HCC): ICD-10-CM

## 2023-12-07 PROCEDURE — 3077F SYST BP >= 140 MM HG: CPT | Performed by: NURSE PRACTITIONER

## 2023-12-07 PROCEDURE — 99214 OFFICE O/P EST MOD 30 MIN: CPT | Performed by: NURSE PRACTITIONER

## 2023-12-07 PROCEDURE — 3078F DIAST BP <80 MM HG: CPT | Performed by: NURSE PRACTITIONER

## 2023-12-07 PROCEDURE — 99212 OFFICE O/P EST SF 10 MIN: CPT | Performed by: NURSE PRACTITIONER

## 2023-12-07 RX ORDER — PREDNISONE 10 MG/1
TABLET ORAL
Qty: 40 TABLET | Refills: 1 | Status: SHIPPED | OUTPATIENT
Start: 2023-12-07 | End: 2024-03-18 | Stop reason: SDUPTHER

## 2023-12-07 RX ORDER — DOXYCYCLINE HYCLATE 100 MG/1
100 CAPSULE ORAL 2 TIMES DAILY
Qty: 20 CAPSULE | Refills: 1 | Status: SHIPPED | OUTPATIENT
Start: 2023-12-07 | End: 2024-03-18 | Stop reason: SDUPTHER

## 2023-12-07 NOTE — PROGRESS NOTES
"Chief Complaint   Patient presents with    Follow-Up     LAST SEEN 06/07/2023 BRITTANY BROWN  6MO. F/V PFT, SOB.       HPI:  Rose Oshea is a 78 y.o. year old female here today for follow-up on COPD and chronic respiratory failure with hypoxia along with PFT results.  Last seen 6/7/2023 by me. She is a current tobacco smoker with greater than 50-pack-year history currently down to 3 cigarettes per day.  Current regimen includes Trelegy 200, albuterol nebulized and Mucinex.  She uses supplemental oxygen between 2 to 4 L/min depending on activity.  Is also currently on low-dose azithromycin on Monday Wednesday Friday.     Patient denies any new or worsening dyspnea or shortness of breath.  Current symptoms include cough, chest congestion, and sputum that is yellow in color.  She denies any new or worsening dyspnea or shortness of breath.  She denies any exacerbations since last visit.  She denies wheezing, chest tightness, or hospitalizations for breathing.      PFT (6/28/2023):  Baseline spirometry shows airflow obstruction with an FEV1/FVC ratio 42 and an FEV1 of 0.69 L or 37% predicted.  No significant bronchodilator response.  Total lung capacity is high normal at 5.46 L or 118% predicted.  There is significant air trapping with residual volume of 169% predicted.  Diffusion capacity is reduced at 33% predicted.  Pulmonary function testing shows airflow obstruction with air trapping and reduced DLCO consistent with advanced obstructive pulmonary disease.    ROS: As per HPI and otherwise negative if not stated.    No past medical history on file.    No past surgical history on file.    No family history on file.    Allergies as of 12/07/2023    (No Known Allergies)        Vitals:  BP (!) 142/62 (BP Location: Left arm, Patient Position: Sitting, BP Cuff Size: Adult)   Pulse 85   Resp 12   Ht 1.549 m (5' 1\")   Wt 45.5 kg (100 lb 4.8 oz)   SpO2 97%     Current medications as of today   Current Outpatient " Medications   Medication Sig Dispense Refill    predniSONE (DELTASONE) 10 MG Tab Take 40mg x 4 days, then take 30mg x 4 days, then take 20mg x 4 days, then 10mg x 4 days with food, then discontinue. 40 Tablet 1    doxycycline (VIBRAMYCIN) 100 MG Cap Take 1 Capsule by mouth 2 times a day. Take until gone. 20 Capsule 1    TRELEGY ELLIPTA 200-62.5-25 MCG/ACT inhaler INHALE 1 PUFF EVERY  Each 1    ipratropium-albuterol (DUONEB) 0.5-2.5 (3) MG/3ML nebulizer solution TAKE 3 ML BY NEBULIZATION EVERY FOUR HOURS AS NEEDED FOR SHORTNESS OF BREATH. 450 mL 11    azithromycin (ZITHROMAX) 250 MG Tab Take 1 Tablet by mouth every Monday, Wednesday, and Friday. 36 Tablet 3    albuterol 108 (90 Base) MCG/ACT Aero Soln inhalation aerosol Inhale 1-2 Puffs every four hours as needed for Shortness of Breath. 1 Each 11    Fluticasone-Umeclidin-Vilant (TRELEGY ELLIPTA) 200-62.5-25 MCG/INH AEROSOL POWDER, BREATH ACTIVATED Inhale 1 Puff every day. 3 Each 3    guaiFENesin ER (MUCINEX) 600 MG TABLET SR 12 HR Take 600 mg by mouth every 12 hours.      atorvastatin (LIPITOR) 10 MG Tab Take 10 mg by mouth every evening.      Cholecalciferol (VITAMIN D3) 25 MCG Tab Take  by mouth every day.      Ibuprofen (MOTRIN PO) Take 400 mg by mouth every day.      amLODIPine (NORVASC) 5 MG Tab Take 5 mg by mouth every day.      ibandronate (BONIVA) 150 MG tablet PLEASE SEE ATTACHED FOR DETAILED DIRECTIONS      liothyronine (CYTOMEL) 5 MCG Tab Take 10 mcg by mouth every day.      losartan (COZAAR) 100 MG Tab Take 100 mg by mouth every day.      triamterene-hctz (MAXZIDE-25/DYAZIDE) 37.5-25 MG Tab Take 1 Tablet by mouth every day.      ipratropium (ATROVENT) 0.02 % Solution Take 1 mL by nebulization every four hours as needed (SOB or cough). 120 mL 11    nicotine polacrilex (NICORETTE) 2 MG lozenge Place 1 Lozenge into mouth between cheek and gum every 3 hours as needed (when desiring to smoke). 168 Lozenge 3    Multiple Vitamins-Minerals (PRESERVISION  AREDS 2 PO) Take  by mouth every day.       No current facility-administered medications for this visit.         Physical Exam:   Gen:           Alert and oriented, No apparent distress. Mood and affect appropriate, normal interaction with examiner.  Eyes:          PERRL, EOM intact, sclere white, conjunctive moist.  Ears:          Not examined.   Hearing:     Grossly intact.  Nose:          Normal, no lesions or deformities.  Dentition:    Good dentition.  Oropharynx:   Tongue normal, posterior pharynx without erythema or exudate.  Neck:        Supple, trachea midline, no masses.  Respiratory Effort: No intercostal retractions or use of accessory muscles.   Lung Auscultation:      Clear to auscultation bilaterally; no rales, rhonchi or wheezing.  CV:            Regular rate and rhythm. No murmurs, rubs or gallops.  Abd:           Not examined.   Lymphadenopathy: Not examined.  Gait and Station: Normal.  Digits and Nails: No clubbing, cyanosis, petechiae, or nodes.   Cranial Nerves: II-XII grossly intact.  Skin:        No rashes, lesions or ulcers noted.               Ext:           No cyanosis or edema.      Assessment:  1. Chronic obstructive pulmonary disease, unspecified COPD type (HCC)  predniSONE (DELTASONE) 10 MG Tab    doxycycline (VIBRAMYCIN) 100 MG Cap      2. Chronic respiratory failure with hypoxia (HCC)  predniSONE (DELTASONE) 10 MG Tab    doxycycline (VIBRAMYCIN) 100 MG Cap          Plan:  Continue current regimen of Trelegy 200, low-dose azithromycin-Monday Wednesday Friday, DuoNebs every 4 hours as needed, albuterol as needed.  Also takes Mucinex for airway clearance.  Patient did not complete PFTs that were ordered at last visit.  We will reschedule.  Patient denies any new or worsening dyspnea or shortness of breath.  Patient will follow-up in 6 months, but can be seen sooner if needed.  Stable at 3 to 4 L/min.    Please note that this dictation was created using voice recognition software. I have  made every reasonable attempt to correct obvious errors, but it is possible there are errors of grammar and possibly content that I did not discover before finalizing the note.

## 2024-02-03 ENCOUNTER — OFFICE VISIT (OUTPATIENT)
Dept: URGENT CARE | Facility: CLINIC | Age: 79
End: 2024-02-03
Payer: MEDICARE

## 2024-02-03 ENCOUNTER — APPOINTMENT (OUTPATIENT)
Dept: RADIOLOGY | Facility: IMAGING CENTER | Age: 79
End: 2024-02-03
Attending: NURSE PRACTITIONER
Payer: MEDICARE

## 2024-02-03 VITALS
DIASTOLIC BLOOD PRESSURE: 52 MMHG | TEMPERATURE: 99 F | HEIGHT: 61 IN | WEIGHT: 102 LBS | RESPIRATION RATE: 18 BRPM | SYSTOLIC BLOOD PRESSURE: 114 MMHG | HEART RATE: 78 BPM | OXYGEN SATURATION: 96 % | BODY MASS INDEX: 19.26 KG/M2

## 2024-02-03 DIAGNOSIS — R05.1 ACUTE COUGH: ICD-10-CM

## 2024-02-03 DIAGNOSIS — J90 PLEURAL EFFUSION: ICD-10-CM

## 2024-02-03 DIAGNOSIS — R06.02 SHORTNESS OF BREATH: ICD-10-CM

## 2024-02-03 DIAGNOSIS — J98.11 ATELECTASIS, BILATERAL: ICD-10-CM

## 2024-02-03 DIAGNOSIS — J96.11 CHRONIC RESPIRATORY FAILURE WITH HYPOXIA (HCC): ICD-10-CM

## 2024-02-03 PROCEDURE — 99214 OFFICE O/P EST MOD 30 MIN: CPT | Performed by: NURSE PRACTITIONER

## 2024-02-03 PROCEDURE — 3074F SYST BP LT 130 MM HG: CPT | Performed by: NURSE PRACTITIONER

## 2024-02-03 PROCEDURE — 3078F DIAST BP <80 MM HG: CPT | Performed by: NURSE PRACTITIONER

## 2024-02-03 PROCEDURE — 71046 X-RAY EXAM CHEST 2 VIEWS: CPT | Mod: TC,FY | Performed by: NURSE PRACTITIONER

## 2024-02-03 RX ORDER — OMEPRAZOLE 20 MG/1
20 CAPSULE, DELAYED RELEASE ORAL
COMMUNITY
Start: 2023-12-12

## 2024-02-03 NOTE — PROGRESS NOTES
Subjective:   Rose Oshea is a 78 y.o. female who presents for Cough (COPD, 4L of O2  if there's movement and and 3 if not moving, wet cough, not able to get rid of it, Prednisone and Azythromicin/Doxy )       HPI  Patient is a pleasant 78 y.o. who presents for evaluation of acute on chronic cough, shortness of breath, and fatigue.  Patient has known history of chronic respiratory failure with hypoxia, is on oxygen continuously via nasal cannula.  States that she is on azithromycin 3 times a week for maintenance, however she discontinued this 4 days ago due to feeling of increasing symptoms.  Is currently on doxycycline, and finished her last prednisone tablet today.  She is also using nebulizer every 4 hours while awake and albuterol inhaler as needed.  Is concerned due to not feeling significantly well despite these measures.  It is noted that patient continues to smoke 3 cigarettes/day.    ROS  All other systems are negative except as documented above within HPI.    MEDS:   Current Outpatient Medications:     omeprazole (PRILOSEC) 20 MG delayed-release capsule, Take 20 mg by mouth every day., Disp: , Rfl:     predniSONE (DELTASONE) 10 MG Tab, Take 40mg x 4 days, then take 30mg x 4 days, then take 20mg x 4 days, then 10mg x 4 days with food, then discontinue., Disp: 40 Tablet, Rfl: 1    doxycycline (VIBRAMYCIN) 100 MG Cap, Take 1 Capsule by mouth 2 times a day. Take until gone., Disp: 20 Capsule, Rfl: 1    TRELEGY ELLIPTA 200-62.5-25 MCG/ACT inhaler, INHALE 1 PUFF EVERY DAY, Disp: 180 Each, Rfl: 1    albuterol 108 (90 Base) MCG/ACT Aero Soln inhalation aerosol, Inhale 1-2 Puffs every four hours as needed for Shortness of Breath., Disp: 1 Each, Rfl: 11    Fluticasone-Umeclidin-Vilant (TRELEGY ELLIPTA) 200-62.5-25 MCG/INH AEROSOL POWDER, BREATH ACTIVATED, Inhale 1 Puff every day., Disp: 3 Each, Rfl: 3    guaiFENesin ER (MUCINEX) 600 MG TABLET SR 12 HR, Take 600 mg by mouth every 12 hours., Disp: , Rfl:      "atorvastatin (LIPITOR) 10 MG Tab, Take 10 mg by mouth every evening., Disp: , Rfl:     Cholecalciferol (VITAMIN D3) 25 MCG Tab, Take  by mouth every day., Disp: , Rfl:     Ibuprofen (MOTRIN PO), Take 400 mg by mouth every day., Disp: , Rfl:     amLODIPine (NORVASC) 5 MG Tab, Take 5 mg by mouth every day., Disp: , Rfl:     liothyronine (CYTOMEL) 5 MCG Tab, Take 10 mcg by mouth every day., Disp: , Rfl:     losartan (COZAAR) 100 MG Tab, Take 100 mg by mouth every day., Disp: , Rfl:     triamterene-hctz (MAXZIDE-25/DYAZIDE) 37.5-25 MG Tab, Take 1 Tablet by mouth every day., Disp: , Rfl:     ipratropium-albuterol (DUONEB) 0.5-2.5 (3) MG/3ML nebulizer solution, TAKE 3 ML BY NEBULIZATION EVERY FOUR HOURS AS NEEDED FOR SHORTNESS OF BREATH., Disp: 450 mL, Rfl: 11    azithromycin (ZITHROMAX) 250 MG Tab, Take 1 Tablet by mouth every Monday, Wednesday, and Friday., Disp: 36 Tablet, Rfl: 3    ipratropium (ATROVENT) 0.02 % Solution, Take 1 mL by nebulization every four hours as needed (SOB or cough)., Disp: 120 mL, Rfl: 11    nicotine polacrilex (NICORETTE) 2 MG lozenge, Place 1 Lozenge into mouth between cheek and gum every 3 hours as needed (when desiring to smoke)., Disp: 168 Lozenge, Rfl: 3    Multiple Vitamins-Minerals (PRESERVISION AREDS 2 PO), Take  by mouth every day., Disp: , Rfl:     ibandronate (BONIVA) 150 MG tablet, PLEASE SEE ATTACHED FOR DETAILED DIRECTIONS, Disp: , Rfl:   ALLERGIES: No Known Allergies    Patient's PMH, SocHx, SurgHx, FamHx, Drug allergies and medications were reviewed.     Objective:   /52 (BP Location: Left arm, Patient Position: Sitting, BP Cuff Size: Adult)   Pulse 78   Temp 37.2 °C (99 °F) (Temporal)   Resp 18   Ht 1.549 m (5' 1\")   Wt 46.3 kg (102 lb)   SpO2 96%   BMI 19.27 kg/m²     Physical Exam  Vitals and nursing note reviewed.   Constitutional:       General: She is awake.      Appearance: Normal appearance. She is well-developed and normal weight.   HENT:      Head: " Normocephalic and atraumatic.      Right Ear: Tympanic membrane, ear canal and external ear normal.      Left Ear: Tympanic membrane, ear canal and external ear normal.      Nose: Nose normal.      Mouth/Throat:      Lips: Pink.      Mouth: Mucous membranes are moist.      Pharynx: Oropharynx is clear. Uvula midline.   Eyes:      Extraocular Movements: Extraocular movements intact.      Conjunctiva/sclera: Conjunctivae normal.      Pupils: Pupils are equal, round, and reactive to light.   Neck:      Thyroid: No thyromegaly.      Trachea: Trachea normal.   Cardiovascular:      Rate and Rhythm: Normal rate and regular rhythm.      Pulses: Normal pulses.      Heart sounds: Normal heart sounds, S1 normal and S2 normal.   Pulmonary:      Effort: Pulmonary effort is normal. No respiratory distress.      Breath sounds: Examination of the right-lower field reveals decreased breath sounds. Examination of the left-lower field reveals decreased breath sounds. Decreased breath sounds and wheezing (throughout) present. No rhonchi or rales.      Comments: +productive cough  Abdominal:      General: Bowel sounds are normal.      Palpations: Abdomen is soft.   Musculoskeletal:         General: Normal range of motion.      Cervical back: Full passive range of motion without pain, normal range of motion and neck supple.   Lymphadenopathy:      Cervical: No cervical adenopathy.   Skin:     General: Skin is warm and dry.      Capillary Refill: Capillary refill takes less than 2 seconds.   Neurological:      General: No focal deficit present.      Mental Status: She is alert and oriented to person, place, and time.      Gait: Gait is intact.   Psychiatric:         Attention and Perception: Attention and perception normal.         Mood and Affect: Mood normal.         Speech: Speech normal.         Behavior: Behavior normal. Behavior is cooperative.         Thought Content: Thought content normal.         Judgment: Judgment normal.      DX-CHEST-2 VIEWS    Result Date: 2/3/2024  2/3/2024 2:51 PM HISTORY/REASON FOR EXAM: Chest pain TECHNIQUE/EXAM DESCRIPTION AND NUMBER OF VIEWS: Two views of the chest. COMPARISON: 1/28/2022 FINDINGS: There is linear bibasilar atelectasis. The heart is normal in size. There is minimal bilateral pleural effusion. Soft tissues and bony structures are unremarkable.     Linear bibasilar atelectasis with minimal bilateral pleural effusion.       Assessment/Plan:   Assessment      1. Atelectasis, bilateral    2. Pleural effusion    3. Chronic respiratory failure with hypoxia (HCC)  - DX-CHEST-2 VIEWS; Future    4. Acute cough  - DX-CHEST-2 VIEWS; Future    5. Shortness of breath  - DX-CHEST-2 VIEWS; Future    Vital signs stable at today's acute urgent care visit.  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  It is known that she has minimal bilateral pleural effusions, recommend close follow-up with pulmonary or in urgent care setting with repeat chest x-ray in 3-5 days.     Advised the patient to follow-up with the primary care provider if symptoms persist.  Strict red flags discussed and indications to immediately call 911 or present to the ED. All questions were encouraged and answered to the patient's satisfaction and understanding, and they agree to the plan of care.     This is an acute problem with uncertain prognosis, medication management and instructions as well as management options were provided.  I personally reviewed prior external notes and test results pertinent to today and independently reviewed and interpreted all diagnostics, to include POCT testing. Time spent evaluating this patient includes preparing for visit, counseling/education, exam, evaluation, obtaining history, and ordering lab/test/procedures.      Please note that this dictation was created using voice recognition software. I have made a reasonable attempt  to correct obvious errors, but I expect that there are errors of grammar and possibly content that I did not discover before finalizing the note.

## 2024-02-13 ENCOUNTER — APPOINTMENT (OUTPATIENT)
Dept: RADIOLOGY | Facility: MEDICAL CENTER | Age: 79
End: 2024-02-13
Attending: STUDENT IN AN ORGANIZED HEALTH CARE EDUCATION/TRAINING PROGRAM
Payer: MEDICARE

## 2024-03-18 DIAGNOSIS — J96.11 CHRONIC RESPIRATORY FAILURE WITH HYPOXIA (HCC): ICD-10-CM

## 2024-03-18 DIAGNOSIS — J44.9 CHRONIC OBSTRUCTIVE PULMONARY DISEASE, UNSPECIFIED COPD TYPE (HCC): ICD-10-CM

## 2024-03-19 RX ORDER — PREDNISONE 10 MG/1
TABLET ORAL
Qty: 40 TABLET | Refills: 1 | Status: SHIPPED | OUTPATIENT
Start: 2024-03-19

## 2024-03-19 RX ORDER — DOXYCYCLINE HYCLATE 100 MG/1
100 CAPSULE ORAL 2 TIMES DAILY
Qty: 20 CAPSULE | Refills: 1 | Status: SHIPPED | OUTPATIENT
Start: 2024-03-19

## 2024-06-07 ENCOUNTER — OFFICE VISIT (OUTPATIENT)
Dept: SLEEP MEDICINE | Facility: MEDICAL CENTER | Age: 79
End: 2024-06-07
Attending: NURSE PRACTITIONER
Payer: MEDICARE

## 2024-06-07 VITALS
SYSTOLIC BLOOD PRESSURE: 130 MMHG | HEIGHT: 62 IN | OXYGEN SATURATION: 93 % | HEART RATE: 97 BPM | BODY MASS INDEX: 20.24 KG/M2 | WEIGHT: 110 LBS | DIASTOLIC BLOOD PRESSURE: 64 MMHG | RESPIRATION RATE: 16 BRPM

## 2024-06-07 DIAGNOSIS — F17.200 TOBACCO DEPENDENCY: ICD-10-CM

## 2024-06-07 DIAGNOSIS — J96.11 CHRONIC RESPIRATORY FAILURE WITH HYPOXIA (HCC): ICD-10-CM

## 2024-06-07 DIAGNOSIS — J44.9 CHRONIC OBSTRUCTIVE PULMONARY DISEASE, UNSPECIFIED COPD TYPE (HCC): ICD-10-CM

## 2024-06-07 PROCEDURE — 3075F SYST BP GE 130 - 139MM HG: CPT | Performed by: NURSE PRACTITIONER

## 2024-06-07 PROCEDURE — 99214 OFFICE O/P EST MOD 30 MIN: CPT | Performed by: NURSE PRACTITIONER

## 2024-06-07 PROCEDURE — 99213 OFFICE O/P EST LOW 20 MIN: CPT | Performed by: NURSE PRACTITIONER

## 2024-06-07 PROCEDURE — 3078F DIAST BP <80 MM HG: CPT | Performed by: NURSE PRACTITIONER

## 2024-06-07 RX ORDER — PREDNISONE 10 MG/1
TABLET ORAL
Qty: 40 TABLET | Refills: 1 | Status: SHIPPED | OUTPATIENT
Start: 2024-06-07

## 2024-06-07 RX ORDER — DOXYCYCLINE HYCLATE 100 MG/1
100 CAPSULE ORAL 2 TIMES DAILY
Qty: 20 CAPSULE | Refills: 1 | Status: SHIPPED | OUTPATIENT
Start: 2024-06-07

## 2024-06-07 RX ORDER — AZITHROMYCIN 250 MG/1
250 TABLET, FILM COATED ORAL
Qty: 36 TABLET | Refills: 3 | Status: SHIPPED | OUTPATIENT
Start: 2024-06-07

## 2024-06-07 ASSESSMENT — PATIENT HEALTH QUESTIONNAIRE - PHQ9: CLINICAL INTERPRETATION OF PHQ2 SCORE: 0

## 2024-06-07 NOTE — PROGRESS NOTES
"Chief Complaint   Patient presents with    Follow-Up     CHART PREP: 06/03/2024    Dx/Last seen: Pulmonary 12/07/2023 MAXIMILIANO Calero    Tests completed: n/a    Test not completed: n/a    Referrals: N/A    Trial on Medications: N/A    O2 or/and CPAP: Stable at 3 to 4 L/min.         HPI:  Rose Oshea is a 79 y.o. year old female here today for follow-up on COPD and chronic respiratory failure with hypoxia along with PFT results.  Last seen 6/7/2023 by me. She is a current tobacco smoker with greater than 50-pack-year history currently down to half pack per day per day.  Current regimen includes Trelegy 200, albuterol nebulized and Mucinex.  She uses supplemental oxygen between 2 to 4 L/min depending on activity.  Is also currently on low-dose azithromycin on Monday Wednesday Friday.  Since last visit use prednisone and doxycycline x 1.     Patient denies any new or worsening dyspnea or shortness of breath.  Current symptoms include cough, chest congestion, and sputum that is yellow in color.  She denies any new or worsening dyspnea or shortness of breath.  She denies any exacerbations since last visit.  She denies wheezing, chest tightness, or hospitalizations for breathing.  Denies any exacerbations since last visit.    ROS: As per HPI and otherwise negative if not stated.    No past medical history on file.    No past surgical history on file.    No family history on file.    Allergies as of 06/07/2024    (No Known Allergies)        Vitals:  /64 (BP Location: Left arm, Patient Position: Sitting, BP Cuff Size: Adult)   Pulse 97   Resp 16   Ht 1.575 m (5' 2\")   Wt 49.9 kg (110 lb)   SpO2 93%     Current medications as of today   Current Outpatient Medications   Medication Sig Dispense Refill    azithromycin (ZITHROMAX) 250 MG Tab Take 1 Tablet by mouth every Monday, Wednesday, and Friday. 36 Tablet 3    doxycycline (VIBRAMYCIN) 100 MG Cap Take 1 Capsule by mouth 2 times a day. Take until gone. 20 Capsule 1 "    predniSONE (DELTASONE) 10 MG Tab Take 40mg x 4 days, then take 30mg x 4 days, then take 20mg x 4 days, then 10mg x 4 days with food, then discontinue. 40 Tablet 1    omeprazole (PRILOSEC) 20 MG delayed-release capsule Take 20 mg by mouth every day.      TRELEGY ELLIPTA 200-62.5-25 MCG/ACT inhaler INHALE 1 PUFF EVERY  Each 1    ipratropium-albuterol (DUONEB) 0.5-2.5 (3) MG/3ML nebulizer solution TAKE 3 ML BY NEBULIZATION EVERY FOUR HOURS AS NEEDED FOR SHORTNESS OF BREATH. 450 mL 11    albuterol 108 (90 Base) MCG/ACT Aero Soln inhalation aerosol Inhale 1-2 Puffs every four hours as needed for Shortness of Breath. 1 Each 11    Fluticasone-Umeclidin-Vilant (TRELEGY ELLIPTA) 200-62.5-25 MCG/INH AEROSOL POWDER, BREATH ACTIVATED Inhale 1 Puff every day. 3 Each 3    guaiFENesin ER (MUCINEX) 600 MG TABLET SR 12 HR Take 600 mg by mouth every 12 hours.      atorvastatin (LIPITOR) 10 MG Tab Take 10 mg by mouth every evening.      Cholecalciferol (VITAMIN D3) 25 MCG Tab Take  by mouth every day.      Ibuprofen (MOTRIN PO) Take 400 mg by mouth every day.      amLODIPine (NORVASC) 5 MG Tab Take 5 mg by mouth every day.      ibandronate (BONIVA) 150 MG tablet PLEASE SEE ATTACHED FOR DETAILED DIRECTIONS      liothyronine (CYTOMEL) 5 MCG Tab Take 10 mcg by mouth every day.      losartan (COZAAR) 100 MG Tab Take 100 mg by mouth every day.      triamterene-hctz (MAXZIDE-25/DYAZIDE) 37.5-25 MG Tab Take 1 Tablet by mouth every day.      ipratropium (ATROVENT) 0.02 % Solution Take 1 mL by nebulization every four hours as needed (SOB or cough). 120 mL 11    nicotine polacrilex (NICORETTE) 2 MG lozenge Place 1 Lozenge into mouth between cheek and gum every 3 hours as needed (when desiring to smoke). 168 Lozenge 3    Multiple Vitamins-Minerals (PRESERVISION AREDS 2 PO) Take  by mouth every day.       No current facility-administered medications for this visit.         Physical Exam:   Gen:           Alert and oriented, No apparent  distress. Mood and affect appropriate, normal interaction with examiner.  Eyes:          PERRL, EOM intact, sclere white, conjunctive moist.  Ears:          Not examined.   Hearing:     Grossly intact.  Nose:          Normal, no lesions or deformities.  Dentition:    Good dentition.  Oropharynx:   Tongue normal, posterior pharynx without erythema or exudate.  Neck:        Supple, trachea midline, no masses.  Respiratory Effort: No intercostal retractions or use of accessory muscles.   Lung Auscultation:      Rhonchi noted to auscultation bilaterally; no rales or wheezing.  CV:            Regular rate and rhythm. No murmurs, rubs or gallops.  Abd:           Not examined.   Lymphadenopathy: Not examined.  Gait and Station: Normal.  Digits and Nails: No clubbing, cyanosis, petechiae, or nodes.   Cranial Nerves: II-XII grossly intact.  Skin:        No rashes, lesions or ulcers noted.               Ext:           No cyanosis or edema.      Assessment:  1. Chronic obstructive pulmonary disease, unspecified COPD type (HCC)  azithromycin (ZITHROMAX) 250 MG Tab    doxycycline (VIBRAMYCIN) 100 MG Cap    predniSONE (DELTASONE) 10 MG Tab      2. Chronic respiratory failure with hypoxia (HCC)  doxycycline (VIBRAMYCIN) 100 MG Cap    predniSONE (DELTASONE) 10 MG Tab      3. Tobacco dependency            Plan:  Continue current regimen of Trelegy 200, low-dose azithromycin-Monday Wednesday Friday, DuoNebs every 4 hours as needed, albuterol as needed.  Also takes Mucinex for airway clearance.  Patient did not complete PFTs that were ordered at last visit.  We will reschedule.  Patient denies any new or worsening dyspnea or shortness of breath.  Patient will follow-up in 6 months, but can be seen sooner if needed.  Stable at 3 to 4 L/min.    Please note that this dictation was created using voice recognition software. I have made every reasonable attempt to correct obvious errors, but it is possible there are errors of grammar and  possibly content that I did not discover before finalizing the note.

## 2024-07-28 DIAGNOSIS — J44.9 CHRONIC OBSTRUCTIVE PULMONARY DISEASE, UNSPECIFIED COPD TYPE (HCC): ICD-10-CM

## 2024-07-30 RX ORDER — AZITHROMYCIN 250 MG/1
TABLET, FILM COATED ORAL
Qty: 36 TABLET | Refills: 3 | Status: SHIPPED | OUTPATIENT
Start: 2024-07-30

## 2024-08-20 DIAGNOSIS — J44.9 CHRONIC OBSTRUCTIVE PULMONARY DISEASE, UNSPECIFIED COPD TYPE (HCC): ICD-10-CM

## 2024-08-20 DIAGNOSIS — J96.11 CHRONIC RESPIRATORY FAILURE WITH HYPOXIA (HCC): ICD-10-CM

## 2024-08-20 RX ORDER — PREDNISONE 10 MG/1
TABLET ORAL
Qty: 40 TABLET | Refills: 1 | Status: SHIPPED | OUTPATIENT
Start: 2024-08-20

## 2024-09-08 DIAGNOSIS — J96.11 CHRONIC RESPIRATORY FAILURE WITH HYPOXIA (HCC): ICD-10-CM

## 2024-09-08 DIAGNOSIS — J44.9 CHRONIC OBSTRUCTIVE PULMONARY DISEASE, UNSPECIFIED COPD TYPE (HCC): ICD-10-CM

## 2024-09-09 RX ORDER — ALBUTEROL SULFATE 90 UG/1
1-2 INHALANT RESPIRATORY (INHALATION) EVERY 4 HOURS PRN
Qty: 8.5 EACH | Refills: 11 | Status: SHIPPED | OUTPATIENT
Start: 2024-09-09

## 2024-09-09 RX ORDER — IPRATROPIUM BROMIDE AND ALBUTEROL SULFATE 2.5; .5 MG/3ML; MG/3ML
3 SOLUTION RESPIRATORY (INHALATION) EVERY 4 HOURS PRN
Qty: 450 ML | Refills: 11 | Status: SHIPPED | OUTPATIENT
Start: 2024-09-09